# Patient Record
Sex: MALE | Race: WHITE
[De-identification: names, ages, dates, MRNs, and addresses within clinical notes are randomized per-mention and may not be internally consistent; named-entity substitution may affect disease eponyms.]

---

## 2020-04-22 ENCOUNTER — HOSPITAL ENCOUNTER (EMERGENCY)
Dept: HOSPITAL 83 - ED | Age: 55
Discharge: HOME | End: 2020-04-22
Payer: COMMERCIAL

## 2020-04-22 VITALS — BODY MASS INDEX: 32.49 KG/M2 | WEIGHT: 195 LBS | HEIGHT: 65 IN

## 2020-04-22 DIAGNOSIS — Z88.0: ICD-10-CM

## 2020-04-22 DIAGNOSIS — J32.9: Primary | ICD-10-CM

## 2020-04-22 DIAGNOSIS — Z79.899: ICD-10-CM

## 2020-04-22 DIAGNOSIS — F17.200: ICD-10-CM

## 2020-04-22 DIAGNOSIS — K21.9: ICD-10-CM

## 2020-04-22 LAB
BASOPHILS # BLD AUTO: 0 10*3/UL (ref 0–0.1)
BASOPHILS NFR BLD AUTO: 0.2 % (ref 0–1)
EOSINOPHIL # BLD AUTO: 0.1 10*3/UL (ref 0–0.4)
EOSINOPHIL # BLD AUTO: 0.9 % (ref 1–4)
ERYTHROCYTE [DISTWIDTH] IN BLOOD BY AUTOMATED COUNT: 22.6 % (ref 0–14.5)
HCT VFR BLD AUTO: 57.5 % (ref 42–52)
LYMPHOCYTES # BLD AUTO: 2.5 10*3/UL (ref 1.3–4.4)
LYMPHOCYTES NFR BLD AUTO: 18 % (ref 27–41)
MCH RBC QN AUTO: 24 PG (ref 27–31)
MCHC RBC AUTO-ENTMCNC: 31.1 G/DL (ref 33–37)
MCV RBC AUTO: 77.2 FL (ref 80–94)
MONOCYTES # BLD AUTO: 1.2 10*3/UL (ref 0.1–1)
MONOCYTES NFR BLD MANUAL: 8.6 % (ref 3–9)
NEUT #: 9.9 10*3/UL (ref 2.3–7.9)
NEUT %: 71.9 % (ref 47–73)
NRBC BLD QL AUTO: 0 10*3/UL (ref 0–0)
PLATELET # BLD AUTO: 229 10*3/UL (ref 130–400)
PMV BLD AUTO: 10.5 FL (ref 9.6–12.3)
RBC # BLD AUTO: 7.45 10*6/UL (ref 4.5–5.9)
WBC NRBC COR # BLD AUTO: 13.8 10*3/UL (ref 4.8–10.8)

## 2021-02-26 ENCOUNTER — HOSPITAL ENCOUNTER (OUTPATIENT)
Dept: HOSPITAL 83 - COVID19 | Age: 56
Discharge: HOME | End: 2021-02-26
Attending: INTERNAL MEDICINE
Payer: COMMERCIAL

## 2021-02-26 DIAGNOSIS — U07.1: Primary | ICD-10-CM

## 2021-07-06 ENCOUNTER — HOSPITAL ENCOUNTER (EMERGENCY)
Dept: HOSPITAL 83 - ED | Age: 56
LOS: 1 days | Discharge: HOME | End: 2021-07-07
Payer: COMMERCIAL

## 2021-07-06 VITALS — HEIGHT: 60.98 IN | WEIGHT: 201 LBS | BODY MASS INDEX: 37.95 KG/M2

## 2021-07-06 DIAGNOSIS — K80.20: Primary | ICD-10-CM

## 2021-07-06 DIAGNOSIS — Z79.899: ICD-10-CM

## 2021-07-06 DIAGNOSIS — Z88.0: ICD-10-CM

## 2021-07-07 LAB
ALBUMIN SERPL-MCNC: 3.5 GM/DL (ref 3.1–4.5)
ALP SERPL-CCNC: 87 U/L (ref 45–117)
ALT SERPL W P-5'-P-CCNC: 37 U/L (ref 12–78)
AST SERPL-CCNC: 15 IU/L (ref 3–35)
BASOPHILS # BLD AUTO: 0 10*3/UL (ref 0–0.1)
BASOPHILS NFR BLD AUTO: 0.2 % (ref 0–1)
BUN SERPL-MCNC: 23 MG/DL (ref 7–24)
CHLORIDE SERPL-SCNC: 107 MMOL/L (ref 98–107)
CREAT SERPL-MCNC: 1.06 MG/DL (ref 0.7–1.3)
EOSINOPHIL # BLD AUTO: 0.1 10*3/UL (ref 0–0.4)
EOSINOPHIL # BLD AUTO: 1.5 % (ref 1–4)
ERYTHROCYTE [DISTWIDTH] IN BLOOD BY AUTOMATED COUNT: 13.9 % (ref 0–14.5)
HCT VFR BLD AUTO: 47.4 % (ref 42–52)
LIPASE SERPL-CCNC: 133 U/L (ref 73–393)
LYMPHOCYTES # BLD AUTO: 2.6 10*3/UL (ref 1.3–4.4)
LYMPHOCYTES NFR BLD AUTO: 28.1 % (ref 27–41)
MCH RBC QN AUTO: 27.9 PG (ref 27–31)
MCHC RBC AUTO-ENTMCNC: 33.3 G/DL (ref 33–37)
MCV RBC AUTO: 83.7 FL (ref 80–94)
MONOCYTES # BLD AUTO: 0.8 10*3/UL (ref 0.1–1)
MONOCYTES NFR BLD MANUAL: 8.7 % (ref 3–9)
NEUT #: 5.6 10*3/UL (ref 2.3–7.9)
NEUT %: 61.3 % (ref 47–73)
NRBC BLD QL AUTO: 0 10*3/UL (ref 0–0)
PLATELET # BLD AUTO: 224 10*3/UL (ref 130–400)
PMV BLD AUTO: 10.9 FL (ref 9.6–12.3)
POTASSIUM SERPL-SCNC: 3.5 MMOL/L (ref 3.5–5.1)
PROT SERPL-MCNC: 7.4 GM/DL (ref 6.4–8.2)
RBC # BLD AUTO: 5.66 10*6/UL (ref 4.5–5.9)
SODIUM SERPL-SCNC: 137 MMOL/L (ref 136–145)
WBC NRBC COR # BLD AUTO: 9.1 10*3/UL (ref 4.8–10.8)

## 2023-07-25 ENCOUNTER — HOSPITAL ENCOUNTER (INPATIENT)
Dept: HOSPITAL 83 - ED | Age: 58
LOS: 2 days | Discharge: ANOTHER HOSPITAL | DRG: 409 | End: 2023-07-27
Attending: STUDENT IN AN ORGANIZED HEALTH CARE EDUCATION/TRAINING PROGRAM | Admitting: STUDENT IN AN ORGANIZED HEALTH CARE EDUCATION/TRAINING PROGRAM
Payer: COMMERCIAL

## 2023-07-25 VITALS — BODY MASS INDEX: 28.2 KG/M2 | HEIGHT: 65 IN | WEIGHT: 169.25 LBS

## 2023-07-25 VITALS — DIASTOLIC BLOOD PRESSURE: 100 MMHG
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
## 2023-07-25 DIAGNOSIS — D72.825: ICD-10-CM

## 2023-07-25 DIAGNOSIS — E87.1: ICD-10-CM
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
## 2023-07-25 DIAGNOSIS — Z71.6: ICD-10-CM

## 2023-07-25 DIAGNOSIS — R73.9: ICD-10-CM

## 2023-07-25 DIAGNOSIS — Z88.0: ICD-10-CM

## 2023-07-25 DIAGNOSIS — F17.290: ICD-10-CM
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
## 2023-07-25 DIAGNOSIS — D75.1: ICD-10-CM

## 2023-07-25 DIAGNOSIS — S36.13XA: ICD-10-CM
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
## 2023-07-25 DIAGNOSIS — K80.00: Primary | ICD-10-CM

## 2023-07-25 DIAGNOSIS — Z83.3: ICD-10-CM

## 2023-07-25 LAB
ALP SERPL-CCNC: 87 U/L (ref 46–116)
ALT SERPL W P-5'-P-CCNC: 29 U/L (ref 10–49)
APTT PPP: 32.7 SECONDS (ref 20–32.1)
BASOPHILS # BLD AUTO: 0 10*3/UL (ref 0–0.1)
BASOPHILS NFR BLD AUTO: 0.4 % (ref 0–1)
BUN SERPL-MCNC: 15 MG/DL (ref 9–23)
CHLORIDE SERPL-SCNC: 100 MMOL/L (ref 98–107)
EOSINOPHIL # BLD AUTO: 0.3 10*3/UL (ref 0–0.4)
EOSINOPHIL # BLD AUTO: 2.2 % (ref 1–4)
ERYTHROCYTE [DISTWIDTH] IN BLOOD BY AUTOMATED COUNT: 20.4 % (ref 0–14.5)
HCT VFR BLD AUTO: 60.7 % (ref 42–52)
HGB UR QL STRIP: (no result)
INR BLD: 1.3 (ref 2–3.5)
LIPASE SERPL-CCNC: 48 U/L (ref 12–53)
LYMPHOCYTES # BLD AUTO: 2.4 10*3/UL (ref 1.3–4.4)
LYMPHOCYTES NFR BLD AUTO: 21.6 % (ref 27–41)
MCH RBC QN AUTO: 27.1 PG (ref 27–31)
MCHC RBC AUTO-ENTMCNC: 32.9 G/DL (ref 33–37)
MCV RBC AUTO: 82.2 FL (ref 80–94)
MONOCYTES # BLD AUTO: 0.9 10*3/UL (ref 0.1–1)
MONOCYTES NFR BLD MANUAL: 8.3 % (ref 3–9)
NEUT #: 7.5 10*3/UL (ref 2.3–7.9)
NEUT %: 67.1 % (ref 47–73)
NRBC BLD QL AUTO: 0 10*3/UL (ref 0–0)
PH UR STRIP: 7.5 [PH] (ref 4.5–8)
PLATELET # BLD AUTO: 232 10*3/UL (ref 130–400)
PMV BLD AUTO: 11.4 FL (ref 9.6–12.3)
POTASSIUM SERPL-SCNC: 4 MMOL/L (ref 3.4–5.1)
PROT SERPL-MCNC: 7.7 GM/DL (ref 6–8)
RBC # BLD AUTO: 7.38 10*6/UL (ref 4.5–5.9)
SP GR UR: >= 1.03 (ref 1–1.03)
UROBILINOGEN UR STRIP-MCNC: 1 E.U./DL (ref 0–1)
WBC NRBC COR # BLD AUTO: 11.2 10*3/UL (ref 4.8–10.8)

## 2023-07-26 VITALS — SYSTOLIC BLOOD PRESSURE: 131 MMHG | DIASTOLIC BLOOD PRESSURE: 94 MMHG

## 2023-07-26 VITALS — DIASTOLIC BLOOD PRESSURE: 88 MMHG

## 2023-07-26 VITALS — DIASTOLIC BLOOD PRESSURE: 94 MMHG | SYSTOLIC BLOOD PRESSURE: 148 MMHG

## 2023-07-26 VITALS — DIASTOLIC BLOOD PRESSURE: 63 MMHG

## 2023-07-26 VITALS — DIASTOLIC BLOOD PRESSURE: 84 MMHG | SYSTOLIC BLOOD PRESSURE: 146 MMHG

## 2023-07-26 LAB
BASOPHILS # BLD AUTO: 0 10*3/UL (ref 0–0.1)
BASOPHILS NFR BLD AUTO: 0.4 % (ref 0–1)
EOSINOPHIL # BLD AUTO: 0.2 10*3/UL (ref 0–0.4)
EOSINOPHIL # BLD AUTO: 1.6 % (ref 1–4)
ERYTHROCYTE [DISTWIDTH] IN BLOOD BY AUTOMATED COUNT: 20.6 % (ref 0–14.5)
HCT VFR BLD AUTO: 59.8 % (ref 42–52)
LYMPHOCYTES # BLD AUTO: 2.3 10*3/UL (ref 1.3–4.4)
LYMPHOCYTES NFR BLD AUTO: 23.1 % (ref 27–41)
MCH RBC QN AUTO: 27.3 PG (ref 27–31)
MCHC RBC AUTO-ENTMCNC: 32.8 G/DL (ref 33–37)
MCV RBC AUTO: 83.2 FL (ref 80–94)
MONOCYTES # BLD AUTO: 1 10*3/UL (ref 0.1–1)
MONOCYTES NFR BLD MANUAL: 9.8 % (ref 3–9)
NEUT #: 6.3 10*3/UL (ref 2.3–7.9)
NEUT %: 64.6 % (ref 47–73)
NRBC BLD QL AUTO: 0 10*3/UL (ref 0–0)
PLATELET # BLD AUTO: 213 10*3/UL (ref 130–400)
PMV BLD AUTO: 10.8 FL (ref 9.6–12.3)
RBC # BLD AUTO: 7.19 10*6/UL (ref 4.5–5.9)
WBC #/AREA URNS HPF: (no result) WBC/HPF (ref 0–5)
WBC NRBC COR # BLD AUTO: 9.8 10*3/UL (ref 4.8–10.8)

## 2023-07-27 ENCOUNTER — ANESTHESIA EVENT (OUTPATIENT)
Dept: OPERATING ROOM | Age: 58
End: 2023-07-27
Payer: COMMERCIAL

## 2023-07-27 ENCOUNTER — HOSPITAL ENCOUNTER (INPATIENT)
Age: 58
LOS: 4 days | Discharge: HOME | DRG: 261 | End: 2023-07-31
Attending: TRANSPLANT SURGERY | Admitting: TRANSPLANT SURGERY
Payer: MEDICAID

## 2023-07-27 ENCOUNTER — ANESTHESIA (OUTPATIENT)
Dept: OPERATING ROOM | Age: 58
End: 2023-07-27
Payer: COMMERCIAL

## 2023-07-27 VITALS — DIASTOLIC BLOOD PRESSURE: 87 MMHG

## 2023-07-27 VITALS — DIASTOLIC BLOOD PRESSURE: 90 MMHG

## 2023-07-27 VITALS — SYSTOLIC BLOOD PRESSURE: 120 MMHG | DIASTOLIC BLOOD PRESSURE: 72 MMHG

## 2023-07-27 VITALS — DIASTOLIC BLOOD PRESSURE: 81 MMHG

## 2023-07-27 DIAGNOSIS — K91.71: Primary | ICD-10-CM

## 2023-07-27 DIAGNOSIS — S36.13XA: ICD-10-CM

## 2023-07-27 DIAGNOSIS — S36.13XA INJURY OF BILE DUCT, INITIAL ENCOUNTER: ICD-10-CM

## 2023-07-27 LAB
25(OH)D3 SERPL-MCNC: 35.5 NG/ML (ref 30–100)
ABO + RH BLD: NORMAL
ALBUMIN SERPL-MCNC: 3.7 G/DL (ref 3.5–5.2)
ALP SERPL-CCNC: 57 U/L (ref 46–116)
ALP SERPL-CCNC: 60 U/L (ref 40–129)
ALT SERPL W P-5'-P-CCNC: 26 U/L (ref 10–49)
ALT SERPL-CCNC: 72 U/L (ref 0–40)
ANION GAP SERPL CALCULATED.3IONS-SCNC: 11 MMOL/L (ref 7–16)
APTT PPP: 32.9 SECONDS (ref 20–32.1)
ARM BAND NUMBER: NORMAL
AST SERPL-CCNC: 78 U/L (ref 0–39)
BASOPHILS # BLD AUTO: 0 10*3/UL (ref 0–0.1)
BASOPHILS # BLD: 0.03 K/UL (ref 0–0.2)
BASOPHILS NFR BLD AUTO: 0.3 % (ref 0–1)
BASOPHILS NFR BLD: 0 % (ref 0–2)
BILIRUB SERPL-MCNC: 1.1 MG/DL (ref 0–1.2)
BLOOD BANK SAMPLE EXPIRATION: NORMAL
BLOOD GROUP ANTIBODIES SERPL: NEGATIVE
BUN BLD-MCNC: 10 MG/DL (ref 6–20)
BUN SERPL-MCNC: 11 MG/DL (ref 9–23)
BUN SERPL-MCNC: 16 MG/DL (ref 6–20)
CA-I BLD-SCNC: 1.1 MMOL/L (ref 1.15–1.33)
CALCIUM SERPL-MCNC: 8 MG/DL (ref 8.6–10.2)
CHLORIDE BLD-SCNC: 106 MMOL/L (ref 100–108)
CHLORIDE SERPL-SCNC: 101 MMOL/L (ref 98–107)
CHLORIDE SERPL-SCNC: 103 MMOL/L (ref 98–107)
CHOLEST SERPL-MCNC: 186 MG/DL (ref ?–200)
CO2 BLD CALC-SCNC: 22 MMOL/L (ref 22–29)
CO2 SERPL-SCNC: 21 MMOL/L (ref 22–29)
CREAT BLD-MCNC: 0.9 MG/DL (ref 0.7–1.2)
CREAT SERPL-MCNC: 1 MG/DL (ref 0.7–1.2)
EGFR, POC: >60 ML/MIN/1.73M2
EOSINOPHIL # BLD AUTO: 0.1 10*3/UL (ref 0–0.4)
EOSINOPHIL # BLD AUTO: 1.5 % (ref 1–4)
EOSINOPHIL # BLD: 0 K/UL (ref 0.05–0.5)
EOSINOPHILS RELATIVE PERCENT: 0 % (ref 0–6)
ERYTHROCYTE [DISTWIDTH] IN BLOOD BY AUTOMATED COUNT: 20 % (ref 11.5–15)
ERYTHROCYTE [DISTWIDTH] IN BLOOD BY AUTOMATED COUNT: 20.2 % (ref 0–14.5)
GFR SERPL CREATININE-BSD FRML MDRD: >60 ML/MIN/1.73M2
GLUCOSE BLD-MCNC: 141 MG/DL (ref 74–99)
GLUCOSE SERPL-MCNC: 159 MG/DL (ref 74–99)
HCT VFR BLD AUTO: 57 % (ref 37–54)
HCT VFR BLD AUTO: 57.5 % (ref 42–52)
HCT VFR BLD AUTO: 69 % (ref 37–54)
HGB BLD-MCNC: 17.4 G/DL (ref 12.5–16.5)
IMM GRANULOCYTES # BLD AUTO: 0.13 K/UL (ref 0–0.58)
IMM GRANULOCYTES NFR BLD: 1 % (ref 0–5)
INR BLD: 1.3 (ref 2–3.5)
LACTATE BLDV-SCNC: 1.9 MMOL/L (ref 0.5–2.2)
LDLC SERPL DIRECT ASSAY-MCNC: 135 MG/DL (ref 9–159)
LYMPHOCYTES # BLD AUTO: 2.2 10*3/UL (ref 1.3–4.4)
LYMPHOCYTES NFR BLD AUTO: 23.4 % (ref 27–41)
LYMPHOCYTES NFR BLD: 0.76 K/UL (ref 1.5–4)
LYMPHOCYTES RELATIVE PERCENT: 4 % (ref 20–42)
MCH RBC QN AUTO: 26.6 PG (ref 26–35)
MCH RBC QN AUTO: 27.3 PG (ref 27–31)
MCHC RBC AUTO-ENTMCNC: 30.5 G/DL (ref 32–34.5)
MCHC RBC AUTO-ENTMCNC: 32.5 G/DL (ref 33–37)
MCV RBC AUTO: 83.9 FL (ref 80–94)
MCV RBC AUTO: 87 FL (ref 80–99.9)
MONOCYTES # BLD AUTO: 0.8 10*3/UL (ref 0.1–1)
MONOCYTES NFR BLD MANUAL: 8.6 % (ref 3–9)
MONOCYTES NFR BLD: 0.28 K/UL (ref 0.1–0.95)
MONOCYTES NFR BLD: 2 % (ref 2–12)
NEGATIVE BASE EXCESS, ART: 2.8 MMOL/L
NEUT #: 6.2 10*3/UL (ref 2.3–7.9)
NEUT %: 65.6 % (ref 47–73)
NEUTROPHILS NFR BLD: 93 % (ref 43–80)
NEUTS SEG NFR BLD: 16.25 K/UL (ref 1.8–7.3)
NRBC BLD QL AUTO: 0 % (ref 0–0)
PLATELET # BLD AUTO: 212 K/UL (ref 130–450)
PLATELET # BLD AUTO: 224 10*3/UL (ref 130–400)
PMV BLD AUTO: 10.9 FL (ref 7–12)
PMV BLD AUTO: 11.5 FL (ref 9.6–12.3)
POC ANION GAP: 9 MMOL/L (ref 7–16)
POC HCO3: 21.8 MMOL/L (ref 22–26)
POC HEMOGLOBIN (CALC): >23.3 G/DL (ref 12.5–15.5)
POC LACTIC ACID: 0.8 MMOL/L (ref 0.5–2.2)
POC O2 SATURATION: 99.9 % (ref 92–98.5)
POC PCO2: 37.7 MM HG (ref 35–45)
POC PH: 7.37 (ref 7.35–7.45)
POC PO2: 278.9 MM HG (ref 80–100)
POTASSIUM BLD-SCNC: 4.5 MMOL/L (ref 3.5–5)
POTASSIUM SERPL-SCNC: 4.5 MMOL/L (ref 3.4–5.1)
POTASSIUM SERPL-SCNC: 4.7 MMOL/L (ref 3.5–5)
PROT SERPL-MCNC: 6.8 G/DL (ref 6.4–8.3)
PROT SERPL-MCNC: 7 GM/DL (ref 6–8)
RBC # BLD AUTO: 6.55 M/UL (ref 3.8–5.8)
RBC # BLD AUTO: 6.85 10*6/UL (ref 4.5–5.9)
RETICS/RBC NFR AUTO: 2.01 % (ref 0.5–2.5)
SODIUM BLD-SCNC: 137 MMOL/L (ref 132–146)
SODIUM SERPL-SCNC: 133 MMOL/L (ref 132–146)
T4 FREE SERPL-MCNC: 0.96 NG/DL (ref 0.89–1.76)
TRIGL SERPL-MCNC: 101 MG/DL (ref ?–150)
WBC NRBC COR # BLD AUTO: 9.4 10*3/UL (ref 4.8–10.8)
WBC OTHER # BLD: 17.5 K/UL (ref 4.5–11.5)

## 2023-07-27 PROCEDURE — 2580000003 HC RX 258

## 2023-07-27 PROCEDURE — 7100000001 HC PACU RECOVERY - ADDTL 15 MIN: Performed by: TRANSPLANT SURGERY

## 2023-07-27 PROCEDURE — 0F170Z3 BYPASS COMMON HEPATIC DUCT TO DUODENUM, OPEN APPROACH: ICD-10-PCS | Performed by: TRANSPLANT SURGERY

## 2023-07-27 PROCEDURE — 88305 TISSUE EXAM BY PATHOLOGIST: CPT

## 2023-07-27 PROCEDURE — 2500000003 HC RX 250 WO HCPCS: Performed by: ANESTHESIOLOGY

## 2023-07-27 PROCEDURE — 86901 BLOOD TYPING SEROLOGIC RH(D): CPT

## 2023-07-27 PROCEDURE — 0FT40ZZ RESECTION OF GALLBLADDER, OPEN APPROACH: ICD-10-PCS | Performed by: TRANSPLANT SURGERY

## 2023-07-27 PROCEDURE — 7100000000 HC PACU RECOVERY - FIRST 15 MIN: Performed by: TRANSPLANT SURGERY

## 2023-07-27 PROCEDURE — 2500000003 HC RX 250 WO HCPCS

## 2023-07-27 PROCEDURE — 6370000000 HC RX 637 (ALT 250 FOR IP)

## 2023-07-27 PROCEDURE — 85014 HEMATOCRIT: CPT

## 2023-07-27 PROCEDURE — 3600000015 HC SURGERY LEVEL 5 ADDTL 15MIN: Performed by: TRANSPLANT SURGERY

## 2023-07-27 PROCEDURE — 86900 BLOOD TYPING SEROLOGIC ABO: CPT

## 2023-07-27 PROCEDURE — 80053 COMPREHEN METABOLIC PANEL: CPT

## 2023-07-27 PROCEDURE — 3600000005 HC SURGERY LEVEL 5 BASE: Performed by: TRANSPLANT SURGERY

## 2023-07-27 PROCEDURE — 0FJB4ZZ INSPECTION OF HEPATOBILIARY DUCT, PERCUTANEOUS ENDOSCOPIC APPROACH: ICD-10-PCS | Performed by: SURGERY

## 2023-07-27 PROCEDURE — 82803 BLOOD GASES ANY COMBINATION: CPT

## 2023-07-27 PROCEDURE — 49905 OMENTAL FLAP INTRA-ABDOM: CPT | Performed by: TRANSPLANT SURGERY

## 2023-07-27 PROCEDURE — 83605 ASSAY OF LACTIC ACID: CPT

## 2023-07-27 PROCEDURE — 80047 BASIC METABLC PNL IONIZED CA: CPT

## 2023-07-27 PROCEDURE — 47600 CHOLECYSTECTOMY: CPT | Performed by: TRANSPLANT SURGERY

## 2023-07-27 PROCEDURE — 85027 COMPLETE CBC AUTOMATED: CPT

## 2023-07-27 PROCEDURE — 47801 PLACEMENT BILE DUCT SUPPORT: CPT | Performed by: TRANSPLANT SURGERY

## 2023-07-27 PROCEDURE — 99285 EMERGENCY DEPT VISIT HI MDM: CPT

## 2023-07-27 PROCEDURE — 2580000003 HC RX 258: Performed by: STUDENT IN AN ORGANIZED HEALTH CARE EDUCATION/TRAINING PROGRAM

## 2023-07-27 PROCEDURE — 86850 RBC ANTIBODY SCREEN: CPT

## 2023-07-27 PROCEDURE — 6360000002 HC RX W HCPCS: Performed by: NURSE ANESTHETIST, CERTIFIED REGISTERED

## 2023-07-27 PROCEDURE — 2709999900 HC NON-CHARGEABLE SUPPLY: Performed by: TRANSPLANT SURGERY

## 2023-07-27 PROCEDURE — 3700000001 HC ADD 15 MINUTES (ANESTHESIA): Performed by: TRANSPLANT SURGERY

## 2023-07-27 PROCEDURE — 88304 TISSUE EXAM BY PATHOLOGIST: CPT

## 2023-07-27 PROCEDURE — 6360000002 HC RX W HCPCS: Performed by: STUDENT IN AN ORGANIZED HEALTH CARE EDUCATION/TRAINING PROGRAM

## 2023-07-27 PROCEDURE — 2140000000 HC CCU INTERMEDIATE R&B

## 2023-07-27 PROCEDURE — 47760 FUSE BILE DUCTS AND BOWEL: CPT | Performed by: TRANSPLANT SURGERY

## 2023-07-27 PROCEDURE — 3700000000 HC ANESTHESIA ATTENDED CARE: Performed by: TRANSPLANT SURGERY

## 2023-07-27 PROCEDURE — 0FB90ZZ EXCISION OF COMMON BILE DUCT, OPEN APPROACH: ICD-10-PCS | Performed by: TRANSPLANT SURGERY

## 2023-07-27 PROCEDURE — 6360000002 HC RX W HCPCS

## 2023-07-27 RX ORDER — ONDANSETRON 2 MG/ML
INJECTION INTRAMUSCULAR; INTRAVENOUS PRN
Status: DISCONTINUED | OUTPATIENT
Start: 2023-07-27 | End: 2023-07-27 | Stop reason: SDUPTHER

## 2023-07-27 RX ORDER — SODIUM CHLORIDE, SODIUM LACTATE, POTASSIUM CHLORIDE, CALCIUM CHLORIDE 600; 310; 30; 20 MG/100ML; MG/100ML; MG/100ML; MG/100ML
INJECTION, SOLUTION INTRAVENOUS CONTINUOUS
Status: DISCONTINUED | OUTPATIENT
Start: 2023-07-27 | End: 2023-07-30

## 2023-07-27 RX ORDER — LABETALOL HYDROCHLORIDE 5 MG/ML
5 INJECTION, SOLUTION INTRAVENOUS
Status: DISCONTINUED | OUTPATIENT
Start: 2023-07-27 | End: 2023-07-27 | Stop reason: HOSPADM

## 2023-07-27 RX ORDER — ONDANSETRON 2 MG/ML
4 INJECTION INTRAMUSCULAR; INTRAVENOUS
Status: DISCONTINUED | OUTPATIENT
Start: 2023-07-27 | End: 2023-07-27 | Stop reason: HOSPADM

## 2023-07-27 RX ORDER — PROMETHAZINE HYDROCHLORIDE 25 MG/ML
12.5 INJECTION, SOLUTION INTRAMUSCULAR; INTRAVENOUS EVERY 4 HOURS PRN
Status: DISCONTINUED | OUTPATIENT
Start: 2023-07-27 | End: 2023-07-31 | Stop reason: HOSPADM

## 2023-07-27 RX ORDER — SODIUM CHLORIDE 0.9 % (FLUSH) 0.9 %
5-40 SYRINGE (ML) INJECTION PRN
Status: DISCONTINUED | OUTPATIENT
Start: 2023-07-27 | End: 2023-07-27 | Stop reason: HOSPADM

## 2023-07-27 RX ORDER — HYDROMORPHONE HYDROCHLORIDE 1 MG/ML
0.5 INJECTION, SOLUTION INTRAMUSCULAR; INTRAVENOUS; SUBCUTANEOUS EVERY 5 MIN PRN
Status: COMPLETED | OUTPATIENT
Start: 2023-07-27 | End: 2023-07-27

## 2023-07-27 RX ORDER — MIDAZOLAM HYDROCHLORIDE 2 MG/2ML
2 INJECTION, SOLUTION INTRAMUSCULAR; INTRAVENOUS
Status: DISCONTINUED | OUTPATIENT
Start: 2023-07-27 | End: 2023-07-27 | Stop reason: HOSPADM

## 2023-07-27 RX ORDER — OXYMETAZOLINE HYDROCHLORIDE 0.05 G/100ML
SPRAY NASAL PRN
Status: DISCONTINUED | OUTPATIENT
Start: 2023-07-27 | End: 2023-07-27 | Stop reason: SDUPTHER

## 2023-07-27 RX ORDER — IPRATROPIUM BROMIDE AND ALBUTEROL SULFATE 2.5; .5 MG/3ML; MG/3ML
1 SOLUTION RESPIRATORY (INHALATION)
Status: DISCONTINUED | OUTPATIENT
Start: 2023-07-27 | End: 2023-07-27 | Stop reason: HOSPADM

## 2023-07-27 RX ORDER — ONDANSETRON 4 MG/1
4 TABLET, ORALLY DISINTEGRATING ORAL EVERY 8 HOURS PRN
Status: DISCONTINUED | OUTPATIENT
Start: 2023-07-27 | End: 2023-07-31 | Stop reason: HOSPADM

## 2023-07-27 RX ORDER — SODIUM CHLORIDE 0.9 % (FLUSH) 0.9 %
5-40 SYRINGE (ML) INJECTION EVERY 12 HOURS SCHEDULED
Status: DISCONTINUED | OUTPATIENT
Start: 2023-07-27 | End: 2023-07-27 | Stop reason: HOSPADM

## 2023-07-27 RX ORDER — METRONIDAZOLE 500 MG/100ML
500 INJECTION, SOLUTION INTRAVENOUS EVERY 8 HOURS
Status: DISCONTINUED | OUTPATIENT
Start: 2023-07-27 | End: 2023-07-31 | Stop reason: HOSPADM

## 2023-07-27 RX ORDER — SODIUM CHLORIDE 9 MG/ML
INJECTION, SOLUTION INTRAVENOUS PRN
Status: DISCONTINUED | OUTPATIENT
Start: 2023-07-27 | End: 2023-07-27 | Stop reason: HOSPADM

## 2023-07-27 RX ORDER — LABETALOL HYDROCHLORIDE 5 MG/ML
INJECTION, SOLUTION INTRAVENOUS PRN
Status: DISCONTINUED | OUTPATIENT
Start: 2023-07-27 | End: 2023-07-27 | Stop reason: SDUPTHER

## 2023-07-27 RX ORDER — BISACODYL 10 MG
10 SUPPOSITORY, RECTAL RECTAL DAILY PRN
Status: DISCONTINUED | OUTPATIENT
Start: 2023-07-27 | End: 2023-07-30

## 2023-07-27 RX ORDER — ENOXAPARIN SODIUM 100 MG/ML
40 INJECTION SUBCUTANEOUS DAILY
Status: DISCONTINUED | OUTPATIENT
Start: 2023-07-28 | End: 2023-07-31 | Stop reason: HOSPADM

## 2023-07-27 RX ORDER — SODIUM CHLORIDE, SODIUM LACTATE, POTASSIUM CHLORIDE, CALCIUM CHLORIDE 600; 310; 30; 20 MG/100ML; MG/100ML; MG/100ML; MG/100ML
INJECTION, SOLUTION INTRAVENOUS CONTINUOUS PRN
Status: DISCONTINUED | OUTPATIENT
Start: 2023-07-27 | End: 2023-07-27 | Stop reason: SDUPTHER

## 2023-07-27 RX ORDER — ONDANSETRON 2 MG/ML
4 INJECTION INTRAMUSCULAR; INTRAVENOUS EVERY 6 HOURS PRN
Status: DISCONTINUED | OUTPATIENT
Start: 2023-07-27 | End: 2023-07-31 | Stop reason: HOSPADM

## 2023-07-27 RX ORDER — SODIUM CHLORIDE 9 MG/ML
INJECTION, SOLUTION INTRAVENOUS PRN
Status: DISCONTINUED | OUTPATIENT
Start: 2023-07-27 | End: 2023-07-31 | Stop reason: HOSPADM

## 2023-07-27 RX ORDER — ACETAMINOPHEN 325 MG/1
650 TABLET ORAL
Status: DISCONTINUED | OUTPATIENT
Start: 2023-07-27 | End: 2023-07-27 | Stop reason: HOSPADM

## 2023-07-27 RX ORDER — NALOXONE HYDROCHLORIDE 0.4 MG/ML
INJECTION, SOLUTION INTRAMUSCULAR; INTRAVENOUS; SUBCUTANEOUS PRN
Status: DISCONTINUED | OUTPATIENT
Start: 2023-07-27 | End: 2023-07-30

## 2023-07-27 RX ORDER — DROPERIDOL 2.5 MG/ML
0.62 INJECTION, SOLUTION INTRAMUSCULAR; INTRAVENOUS
Status: DISCONTINUED | OUTPATIENT
Start: 2023-07-27 | End: 2023-07-27 | Stop reason: HOSPADM

## 2023-07-27 RX ORDER — ROCURONIUM BROMIDE 10 MG/ML
INJECTION, SOLUTION INTRAVENOUS PRN
Status: DISCONTINUED | OUTPATIENT
Start: 2023-07-27 | End: 2023-07-27 | Stop reason: SDUPTHER

## 2023-07-27 RX ORDER — DIPHENHYDRAMINE HYDROCHLORIDE 50 MG/ML
12.5 INJECTION INTRAMUSCULAR; INTRAVENOUS
Status: DISCONTINUED | OUTPATIENT
Start: 2023-07-27 | End: 2023-07-27 | Stop reason: HOSPADM

## 2023-07-27 RX ORDER — DEXAMETHASONE SODIUM PHOSPHATE 10 MG/ML
INJECTION INTRAMUSCULAR; INTRAVENOUS PRN
Status: DISCONTINUED | OUTPATIENT
Start: 2023-07-27 | End: 2023-07-27 | Stop reason: SDUPTHER

## 2023-07-27 RX ORDER — PHENYLEPHRINE HCL IN 0.9% NACL 1 MG/10 ML
SYRINGE (ML) INTRAVENOUS PRN
Status: DISCONTINUED | OUTPATIENT
Start: 2023-07-27 | End: 2023-07-27 | Stop reason: SDUPTHER

## 2023-07-27 RX ORDER — CEFAZOLIN SODIUM 1 G/3ML
INJECTION, POWDER, FOR SOLUTION INTRAMUSCULAR; INTRAVENOUS PRN
Status: DISCONTINUED | OUTPATIENT
Start: 2023-07-27 | End: 2023-07-27 | Stop reason: SDUPTHER

## 2023-07-27 RX ORDER — SODIUM CHLORIDE 9 MG/ML
INJECTION, SOLUTION INTRAVENOUS CONTINUOUS PRN
Status: DISCONTINUED | OUTPATIENT
Start: 2023-07-27 | End: 2023-07-27 | Stop reason: SDUPTHER

## 2023-07-27 RX ORDER — LEVOFLOXACIN 5 MG/ML
750 INJECTION, SOLUTION INTRAVENOUS EVERY 24 HOURS
Status: DISCONTINUED | OUTPATIENT
Start: 2023-07-27 | End: 2023-07-31 | Stop reason: HOSPADM

## 2023-07-27 RX ORDER — SODIUM CHLORIDE 0.9 % (FLUSH) 0.9 %
10 SYRINGE (ML) INJECTION PRN
Status: DISCONTINUED | OUTPATIENT
Start: 2023-07-27 | End: 2023-07-31 | Stop reason: HOSPADM

## 2023-07-27 RX ORDER — HYDROMORPHONE HYDROCHLORIDE 1 MG/ML
0.25 INJECTION, SOLUTION INTRAMUSCULAR; INTRAVENOUS; SUBCUTANEOUS EVERY 5 MIN PRN
Status: DISCONTINUED | OUTPATIENT
Start: 2023-07-27 | End: 2023-07-27 | Stop reason: HOSPADM

## 2023-07-27 RX ORDER — FENTANYL CITRATE 50 UG/ML
INJECTION, SOLUTION INTRAMUSCULAR; INTRAVENOUS PRN
Status: DISCONTINUED | OUTPATIENT
Start: 2023-07-27 | End: 2023-07-27 | Stop reason: SDUPTHER

## 2023-07-27 RX ORDER — HYDRALAZINE HYDROCHLORIDE 20 MG/ML
5 INJECTION INTRAMUSCULAR; INTRAVENOUS
Status: DISCONTINUED | OUTPATIENT
Start: 2023-07-27 | End: 2023-07-27 | Stop reason: HOSPADM

## 2023-07-27 RX ORDER — SODIUM CHLORIDE 0.9 % (FLUSH) 0.9 %
10 SYRINGE (ML) INJECTION EVERY 12 HOURS SCHEDULED
Status: DISCONTINUED | OUTPATIENT
Start: 2023-07-27 | End: 2023-07-31 | Stop reason: HOSPADM

## 2023-07-27 RX ADMIN — METRONIDAZOLE 500 MG: 500 INJECTION, SOLUTION INTRAVENOUS at 22:56

## 2023-07-27 RX ADMIN — HYDROMORPHONE HYDROCHLORIDE 0.5 MG: 1 INJECTION, SOLUTION INTRAMUSCULAR; INTRAVENOUS; SUBCUTANEOUS at 19:50

## 2023-07-27 RX ADMIN — HYDROMORPHONE HYDROCHLORIDE 0.5 MG: 1 INJECTION, SOLUTION INTRAMUSCULAR; INTRAVENOUS; SUBCUTANEOUS at 19:35

## 2023-07-27 RX ADMIN — CEFAZOLIN 2 G: 1 INJECTION, POWDER, FOR SOLUTION INTRAMUSCULAR; INTRAVENOUS at 16:20

## 2023-07-27 RX ADMIN — HYDROMORPHONE HYDROCHLORIDE 0.5 MG: 1 INJECTION, SOLUTION INTRAMUSCULAR; INTRAVENOUS; SUBCUTANEOUS at 19:57

## 2023-07-27 RX ADMIN — ONDANSETRON HYDROCHLORIDE 4 MG: 2 SOLUTION INTRAMUSCULAR; INTRAVENOUS at 18:19

## 2023-07-27 RX ADMIN — SODIUM CHLORIDE, POTASSIUM CHLORIDE, SODIUM LACTATE AND CALCIUM CHLORIDE: 600; 310; 30; 20 INJECTION, SOLUTION INTRAVENOUS at 16:01

## 2023-07-27 RX ADMIN — SODIUM CHLORIDE, POTASSIUM CHLORIDE, SODIUM LACTATE AND CALCIUM CHLORIDE: 600; 310; 30; 20 INJECTION, SOLUTION INTRAVENOUS at 22:38

## 2023-07-27 RX ADMIN — SODIUM CHLORIDE: 9 INJECTION, SOLUTION INTRAVENOUS at 16:00

## 2023-07-27 RX ADMIN — FENTANYL CITRATE 50 MCG: 50 INJECTION, SOLUTION INTRAMUSCULAR; INTRAVENOUS at 18:14

## 2023-07-27 RX ADMIN — LABETALOL HYDROCHLORIDE 2.5 MG: 5 INJECTION INTRAVENOUS at 18:39

## 2023-07-27 RX ADMIN — DEXAMETHASONE SODIUM PHOSPHATE 10 MG: 10 INJECTION INTRAMUSCULAR; INTRAVENOUS at 18:19

## 2023-07-27 RX ADMIN — Medication 4 SPRAY: at 17:29

## 2023-07-27 RX ADMIN — FENTANYL CITRATE 50 MCG: 50 INJECTION, SOLUTION INTRAMUSCULAR; INTRAVENOUS at 16:37

## 2023-07-27 RX ADMIN — SODIUM CHLORIDE, PRESERVATIVE FREE 10 ML: 5 INJECTION INTRAVENOUS at 22:55

## 2023-07-27 RX ADMIN — SODIUM CHLORIDE, POTASSIUM CHLORIDE, SODIUM LACTATE AND CALCIUM CHLORIDE: 600; 310; 30; 20 INJECTION, SOLUTION INTRAVENOUS at 17:50

## 2023-07-27 RX ADMIN — Medication 100 MCG: at 16:46

## 2023-07-27 RX ADMIN — Medication: at 23:32

## 2023-07-27 RX ADMIN — SODIUM CHLORIDE: 9 INJECTION, SOLUTION INTRAVENOUS at 17:28

## 2023-07-27 RX ADMIN — HYDROMORPHONE HYDROCHLORIDE 0.5 MG: 1 INJECTION, SOLUTION INTRAMUSCULAR; INTRAVENOUS; SUBCUTANEOUS at 19:30

## 2023-07-27 RX ADMIN — ROCURONIUM BROMIDE 50 MG: 10 INJECTION, SOLUTION INTRAVENOUS at 16:10

## 2023-07-27 ASSESSMENT — PAIN SCALES - GENERAL
PAINLEVEL_OUTOF10: 10

## 2023-07-27 ASSESSMENT — PAIN DESCRIPTION - LOCATION
LOCATION: ABDOMEN

## 2023-07-27 ASSESSMENT — PAIN DESCRIPTION - PAIN TYPE
TYPE: SURGICAL PAIN;ACUTE PAIN
TYPE: SURGICAL PAIN
TYPE: SURGICAL PAIN;ACUTE PAIN

## 2023-07-27 ASSESSMENT — PAIN DESCRIPTION - DESCRIPTORS
DESCRIPTORS: ACHING;SHARP;SHOOTING
DESCRIPTORS: SHARP
DESCRIPTORS: DISCOMFORT

## 2023-07-27 ASSESSMENT — LIFESTYLE VARIABLES: SMOKING_STATUS: 1

## 2023-07-27 ASSESSMENT — PAIN DESCRIPTION - FREQUENCY
FREQUENCY: CONTINUOUS

## 2023-07-27 ASSESSMENT — PAIN DESCRIPTION - ORIENTATION
ORIENTATION: MID;INNER
ORIENTATION: INNER;MID
ORIENTATION: MID;LOWER

## 2023-07-27 NOTE — ANESTHESIA POSTPROCEDURE EVALUATION
Department of Anesthesiology  Postprocedure Note    Patient: Lane Hilario  MRN: [id number]  YOB: 1965  Date of evaluation: 7/27/2023      Procedure Summary     Date: 07/27/23 Room / Location: [facility] OR 05 / [facility]    Anesthesia Start: 1600 Anesthesia Stop: 1858    Procedure: Bile Duct Repair, Hepaticoduodenostomy with Cholecystectomy (Abdomen) Diagnosis:       Injury of bile duct, initial encounter      (Injury of bile duct, initial encounter [phone])    Surgeons: Julio Hall MD Responsible Provider: Clive Hawkins MD    Anesthesia Type: general ASA Status: 4 - Emergent          Anesthesia Type: No value filed.     Chip Phase I:      Chip Phase II:        Anesthesia Post Evaluation    Patient location during evaluation: PACU  Patient participation: complete - patient participated  Level of consciousness: awake and alert  Airway patency: patent  Nausea & Vomiting: no nausea and no vomiting  Complications: no  Cardiovascular status: hemodynamically stable  Respiratory status: acceptable  Hydration status: euvolemic

## 2023-07-27 NOTE — PROGRESS NOTES
[facility] Surgery    Direct phone call from Dr. Eduardo Garrett at [facility].  Common bile duct transection recognized in the operating room without bleeding. Emergent transfer from OR to OR. Patient has no next of kin in his chart.   Emergent operation for bile duct reconstruction    Electronically signed by Li Augustin MD on 7/27/2023 at 4:26 PM

## 2023-07-27 NOTE — ED PROVIDER NOTES
Justino        Pt Name: Dinesh Ortiz  MRN: [id number]  [address] [facility] [place] 1965  Date of evaluation: 7/27/2023  Provider: Elyssa Butler DO  PCP: No primary care provider on file. Note Started: 3:57 PM EDT 7/27/23    CHIEF COMPLAINT       No chief complaint on file. HISTORY OF PRESENT ILLNESS: 1 or more Elements        Limitations to history : Sedated    Dinesh Ortiz is a 56 y.o. male who presents for emergent operative management. Patient was apparently at [facility] having a cholecystectomy when the common bile duct was interrupted. The surgeon at [facility] spoke with our Dr. Jesusita Marin who requested transfer for operative repair. Patient arrived via stat medevac sedated, intubated on mechanical ventilation, stopped in the ED for registration and was taken directly to the OR as surgeon is scrubbed in and awaiting. Medical screening exam was performed. Nursing Notes were all reviewed and agreed with or any disagreements were addressed in the HPI. REVIEW OF EXTERNAL NOTE :       No recent notes to review in EMR    REVIEW OF SYSTEMS :      Positives and Pertinent negatives as per HPI. SURGICAL HISTORY   No past surgical history on file. CURRENTMEDICATIONS       Previous Medications    No medications on file       ALLERGIES     Patient has no allergy information on record. FAMILYHISTORY     No family history on file. SOCIAL HISTORY          SCREENINGS                                     PHYSICAL EXAM  1 or more Elements     ED Triage Vitals   BP Temp Temp src Pulse Resp SpO2 Height Weight   -- -- -- -- -- -- -- --           Constitutional/General: Sedated with mechanical ventilation  Head: Normocephalic and atraumatic  Respiratory: Lungs clear to auscultation bilaterally, no wheezes, rales, or rhonchi. Not in respiratory distress  Cardiovascular:  Regular rate.  Regular

## 2023-07-27 NOTE — ANESTHESIA PROCEDURE NOTES
Arterial Line:    An arterial line was placed using surface landmarks, in the OR for the following indication(s): continuous blood pressure monitoring and blood sampling needed. A 20 gauge (size), 1 and 3/4 inch (length), Arrow (type) catheter was placed, Seldinger technique used, into the left radial artery, secured by tape and Tegaderm. Anesthesia type: General    Events:  patient tolerated procedure well with no complications. 7/27/2023 4:10 PM7/27/2023 4:15 PM  Anesthesiologist: Fernando Hernández MD  Resident/CRNA: MIRNA Garsia CRNA  Other anesthesia staff: Gio Ferrer RN  Performed:  Other anesthesia staff   Preanesthetic Checklist  Completed: patient identified, IV checked, site marked, risks and benefits discussed, surgical/procedural consents, equipment checked, pre-op evaluation, timeout performed, anesthesia consent given, oxygen available and monitors applied/VS acknowledged

## 2023-07-27 NOTE — PROGRESS NOTES
Patient arrived to OR suite via [facility] as OR to OR Transfer from [facility]. With patient was a bag of belongings from [facility] as well as a paper medical record from his visit(s) regarding his current treatment. Belongings will return with patient to recovery and nurse will be notified at that time.      Electronically signed by Maria Fernanda Duncan RN on 7/27/23 at 5:58 PM EDT

## 2023-07-27 NOTE — ANESTHESIA PRE PROCEDURE
visit.                                           BP Readings from Last 3 Encounters:   No data found for BP       NPO Status:                                                                                 BMI:   Wt Readings from Last 3 Encounters:   No data found for Wt     There is no height or weight on file to calculate BMI.    CBC: No results found for: WBC, RBC, HGB, HCT, MCV, RDW, PLT    CMP:   Lab Results   Component Value Date/Time    CREATININE 0.9 07/27/2023 04:17 PM       POC Tests:   Recent Labs     07/27/23  1617   POCGLU 141*   POCNA 137   POCK 4.5   POCCL 106   POCBUN 10   POCHCT 69*       Coags: No results found for: PROTIME, INR, APTT    HCG (If Applicable): No results found for: PREGTESTUR, PREGSERUM, HCG, HCGQUANT     ABGs: No results found for: PHART, PO2ART, DBS6ELV, DGI9XYA, BEART, P2NPGKTS     Type & Screen (If Applicable):  No results found for: LABABO, LABRH    Drug/Infectious Status (If Applicable):  No results found for: HIV, HEPCAB    COVID-19 Screening (If Applicable): No results found for: COVID19        Anesthesia Evaluation  Patient summary reviewed  Airway: Mallampati: Unable to assess / NA         Intubated Dental:          Pulmonary: breath sounds clear to auscultation  (+) current smoker                           Cardiovascular:            Rhythm: regular  Rate: normal                    Neuro/Psych:                ROS comment: Intubated and sedated on arrival to OR GI/Hepatic/Renal:            ROS comment: Acute cholecystitis  . Endo/Other:    (+) blood dyscrasia (Polycythemia)::., electrolyte abnormalities (Hyponatremia), . Abdominal:             Vascular: negative vascular ROS. Other Findings:           Anesthesia Plan      general     ASA 4 - emergent       Induction: intravenous. arterial line                        Unable to obtain anesthesia consent from patient or patient's family due to patient's medical condition and emergent need for surgery.

## 2023-07-28 LAB
ALBUMIN SERPL-MCNC: 4.1 G/DL (ref 3.5–5.2)
ALP SERPL-CCNC: 58 U/L (ref 40–129)
ALT SERPL-CCNC: 59 U/L (ref 0–40)
ANION GAP SERPL CALCULATED.3IONS-SCNC: 11 MMOL/L (ref 7–16)
AST SERPL-CCNC: 65 U/L (ref 0–39)
BASOPHILS # BLD: 0 K/UL (ref 0–0.2)
BASOPHILS NFR BLD: 0 % (ref 0–2)
BILIRUB DIRECT SERPL-MCNC: 0.4 MG/DL (ref 0–0.3)
BILIRUB INDIRECT SERPL-MCNC: 0.8 MG/DL (ref 0–1)
BILIRUB SERPL-MCNC: 1.2 MG/DL (ref 0–1.2)
BUN SERPL-MCNC: 20 MG/DL (ref 6–20)
CALCIUM SERPL-MCNC: 8.9 MG/DL (ref 8.6–10.2)
CHLORIDE SERPL-SCNC: 101 MMOL/L (ref 98–107)
CO2 SERPL-SCNC: 24 MMOL/L (ref 22–29)
CREAT SERPL-MCNC: 1.1 MG/DL (ref 0.7–1.2)
EOSINOPHIL # BLD: 0.15 K/UL (ref 0.05–0.5)
EOSINOPHILS RELATIVE PERCENT: 1 % (ref 0–6)
ERYTHROCYTE [DISTWIDTH] IN BLOOD BY AUTOMATED COUNT: 20.3 % (ref 11.5–15)
GFR SERPL CREATININE-BSD FRML MDRD: >60 ML/MIN/1.73M2
GLUCOSE SERPL-MCNC: 99 MG/DL (ref 74–99)
HCT VFR BLD AUTO: 57.5 % (ref 37–54)
HGB BLD-MCNC: 17.8 G/DL (ref 12.5–16.5)
LYMPHOCYTES NFR BLD: 1.08 K/UL (ref 1.5–4)
LYMPHOCYTES RELATIVE PERCENT: 6 % (ref 20–42)
MCH RBC QN AUTO: 26.7 PG (ref 26–35)
MCHC RBC AUTO-ENTMCNC: 31 G/DL (ref 32–34.5)
MCV RBC AUTO: 86.3 FL (ref 80–99.9)
MONOCYTES NFR BLD: 1.39 K/UL (ref 0.1–0.95)
MONOCYTES NFR BLD: 8 % (ref 2–12)
NEUTROPHILS NFR BLD: 85 % (ref 43–80)
NEUTS SEG NFR BLD: 15.17 K/UL (ref 1.8–7.3)
PLATELET # BLD AUTO: 257 K/UL (ref 130–450)
PMV BLD AUTO: 10.8 FL (ref 7–12)
POTASSIUM SERPL-SCNC: 4.8 MMOL/L (ref 3.5–5)
PROT SERPL-MCNC: 7.1 G/DL (ref 6.4–8.3)
RBC # BLD AUTO: 6.66 M/UL (ref 3.8–5.8)
RBC # BLD: ABNORMAL 10*6/UL
RBC # BLD: ABNORMAL 10*6/UL
SODIUM SERPL-SCNC: 136 MMOL/L (ref 132–146)
WBC OTHER # BLD: 17.8 K/UL (ref 4.5–11.5)

## 2023-07-28 PROCEDURE — 80076 HEPATIC FUNCTION PANEL: CPT

## 2023-07-28 PROCEDURE — 2580000003 HC RX 258: Performed by: STUDENT IN AN ORGANIZED HEALTH CARE EDUCATION/TRAINING PROGRAM

## 2023-07-28 PROCEDURE — 80048 BASIC METABOLIC PNL TOTAL CA: CPT

## 2023-07-28 PROCEDURE — 80074 ACUTE HEPATITIS PANEL: CPT

## 2023-07-28 PROCEDURE — C9113 INJ PANTOPRAZOLE SODIUM, VIA: HCPCS

## 2023-07-28 PROCEDURE — 6360000002 HC RX W HCPCS

## 2023-07-28 PROCEDURE — 2140000000 HC CCU INTERMEDIATE R&B

## 2023-07-28 PROCEDURE — 85027 COMPLETE CBC AUTOMATED: CPT

## 2023-07-28 PROCEDURE — 2700000000 HC OXYGEN THERAPY PER DAY

## 2023-07-28 PROCEDURE — 6360000002 HC RX W HCPCS: Performed by: STUDENT IN AN ORGANIZED HEALTH CARE EDUCATION/TRAINING PROGRAM

## 2023-07-28 RX ORDER — PANTOPRAZOLE SODIUM 40 MG/10ML
40 INJECTION, POWDER, LYOPHILIZED, FOR SOLUTION INTRAVENOUS 2 TIMES DAILY
Status: DISCONTINUED | OUTPATIENT
Start: 2023-07-28 | End: 2023-07-31 | Stop reason: HOSPADM

## 2023-07-28 RX ADMIN — METHOCARBAMOL 500 MG: 100 INJECTION INTRAMUSCULAR; INTRAVENOUS at 16:13

## 2023-07-28 RX ADMIN — SODIUM CHLORIDE, PRESERVATIVE FREE 10 ML: 5 INJECTION INTRAVENOUS at 08:59

## 2023-07-28 RX ADMIN — METHOCARBAMOL 500 MG: 100 INJECTION INTRAMUSCULAR; INTRAVENOUS at 00:20

## 2023-07-28 RX ADMIN — PANTOPRAZOLE SODIUM 40 MG: 40 INJECTION, POWDER, FOR SOLUTION INTRAVENOUS at 08:59

## 2023-07-28 RX ADMIN — METRONIDAZOLE 500 MG: 500 INJECTION, SOLUTION INTRAVENOUS at 04:30

## 2023-07-28 RX ADMIN — METHOCARBAMOL 500 MG: 100 INJECTION INTRAMUSCULAR; INTRAVENOUS at 09:05

## 2023-07-28 RX ADMIN — METRONIDAZOLE 500 MG: 500 INJECTION, SOLUTION INTRAVENOUS at 21:48

## 2023-07-28 RX ADMIN — PANTOPRAZOLE SODIUM 40 MG: 40 INJECTION, POWDER, FOR SOLUTION INTRAVENOUS at 21:47

## 2023-07-28 RX ADMIN — ENOXAPARIN SODIUM 40 MG: 100 INJECTION SUBCUTANEOUS at 08:58

## 2023-07-28 RX ADMIN — SODIUM CHLORIDE, PRESERVATIVE FREE 10 ML: 5 INJECTION INTRAVENOUS at 21:57

## 2023-07-28 RX ADMIN — SODIUM CHLORIDE, POTASSIUM CHLORIDE, SODIUM LACTATE AND CALCIUM CHLORIDE: 600; 310; 30; 20 INJECTION, SOLUTION INTRAVENOUS at 15:10

## 2023-07-28 RX ADMIN — LEVOFLOXACIN 750 MG: 5 INJECTION, SOLUTION INTRAVENOUS at 00:44

## 2023-07-28 RX ADMIN — METRONIDAZOLE 500 MG: 500 INJECTION, SOLUTION INTRAVENOUS at 11:55

## 2023-07-28 ASSESSMENT — PAIN DESCRIPTION - LOCATION
LOCATION: ABDOMEN

## 2023-07-28 ASSESSMENT — PAIN DESCRIPTION - ONSET
ONSET: ON-GOING

## 2023-07-28 ASSESSMENT — PAIN - FUNCTIONAL ASSESSMENT
PAIN_FUNCTIONAL_ASSESSMENT: ACTIVITIES ARE NOT PREVENTED

## 2023-07-28 ASSESSMENT — PAIN DESCRIPTION - ORIENTATION
ORIENTATION: MID

## 2023-07-28 ASSESSMENT — PAIN DESCRIPTION - PAIN TYPE
TYPE: SURGICAL PAIN

## 2023-07-28 ASSESSMENT — PAIN SCALES - GENERAL
PAINLEVEL_OUTOF10: 6
PAINLEVEL_OUTOF10: 4
PAINLEVEL_OUTOF10: 3
PAINLEVEL_OUTOF10: 4
PAINLEVEL_OUTOF10: 3
PAINLEVEL_OUTOF10: 5
PAINLEVEL_OUTOF10: 2
PAINLEVEL_OUTOF10: 7

## 2023-07-28 ASSESSMENT — PAIN DESCRIPTION - FREQUENCY
FREQUENCY: CONTINUOUS

## 2023-07-28 ASSESSMENT — PAIN DESCRIPTION - DESCRIPTORS
DESCRIPTORS: ACHING;DISCOMFORT;SORE

## 2023-07-28 NOTE — CARE COORDINATION
Social Work/ Case Management Transition of Care Planning ([address], [address]): Pt was transferred from [facility] for an emergent operative issue. SW met with Pt at bedside who states he lives in a one story home with no stairs to enter. Pt states he lives alone. Pt does drive and works. Pt states his emergency contact is Jaylon Varela [phone]. Pt states he has no history with hhc/snf. Pt's discharge plan is home with no needs. DECLAN/JUAN JOSÉ  to follow. [address] [place]  7/28/2023    Case Management Assessment  Initial Evaluation    Date/Time of Evaluation: 7/28/2023 11:40 AM  Assessment Completed by: 1 SEBASTIAN Gupta    If patient is discharged prior to next notation, then this note serves as note for discharge by case management. Patient Name: Dinesh Ortiz                   YOB: 1965  Diagnosis: Common bile duct transection, initial encounter [S36.13XA]  Accidental intraoperative puncture involving digestive system during digestive system procedure [K91.71]                   Date / Time: 7/27/2023  4:31 PM    Patient Admission Status: Inpatient   Readmission Risk (Low < 19, Mod (19-27), High > 27): Readmission Risk Score: 10.7    Current PCP: No primary care provider on file. PCP verified by ? Yes [facility]))    Chart Reviewed: Yes      History Provided by: Patient  Patient Orientation: Alert and Oriented    Patient Cognition: Alert    Hospitalization in the last 30 days (Readmission):  No    If yes, Readmission Assessment in CM Navigator will be completed.     Advance Directives:      Code Status: Full Code   Patient's Primary Decision Maker is:        Discharge Planning:    Patient lives with: Alone Type of Home: House  Primary Care Giver: Self  Patient Support Systems include: Friends/Neighbors   Current Financial resources:    Current community resources:    Current services prior to admission: None            Current DME:              Type of

## 2023-07-28 NOTE — PROGRESS NOTES
[facility] Quality Flow/Interdisciplinary Rounds Progress Note        Quality Flow Rounds held on July 28, 2023    Disciplines Attending:  Bedside Nurse, , , and Nursing Unit Leadership    Amrita Duval was admitted on 7/27/2023  4:31 PM    Anticipated Discharge Date:       Disposition:    Kt Score:  Kt Scale Score: 19    Readmission Risk              Risk of Unplanned Readmission:  7           Discussed patient goal for the day, patient clinical progression, and barriers to discharge.   The following Goal(s) of the Day/Commitment(s) have been identified:  Diagnostics - Report Results and Labs - Report Results      Elio Suarez RN  July 28, 2023

## 2023-07-28 NOTE — H&P
Hepatobiliary and Pancreatic Surgery Attending History and Physical    Patient's Name/Date of Birth: Karishma Bowles /1965 (55 y.o.)    Date: July 27, 2023     CC: common bile duct injury    HPI:  Patient presented to our hospital from an outside institution via [address] to OR transfer. I spoke with the surgeon from [facility] who informed me that the patient had acute cholecystitis. He was taken to the OR for a laparoscopic cholecystectomy. He states that he dissected out the gallbladder and cystic duct and transected it. Upon transection he had two lumens as the cystic duct was adhered to the common bile duct. He then called me to inform me that he had a common bile duct transection. I informed in to place drains, keep the patient intubated and we will send a helicopter to definitely repair the injury. The patient arrived and was taken immediately to the OR.     PMH: unable to obtain    PSH: laparoscopy performed that day    Current Facility-Administered Medications   Medication Dose Route Frequency Provider Last Rate Last Admin    sodium chloride flush 0.9 % injection 10 mL  10 mL IntraVENous 2 times per day Gianmarino C Gianfrate, DO        sodium chloride flush 0.9 % injection 10 mL  10 mL IntraVENous PRN Gianmarino C Gianfrate, DO        0.9 % sodium chloride infusion   IntraVENous PRN Gianmarino C Gianfrate, DO        ondansetron (ZOFRAN-ODT) disintegrating tablet 4 mg  4 mg Oral Q8H PRN Gianmarino C Gianfrate, DO        Or    ondansetron (ZOFRAN) injection 4 mg  4 mg IntraVENous Q6H PRN Gianmarino C Gianfrate, DO        enoxaparin (LOVENOX) injection 40 mg  40 mg SubCUTAneous Daily Gianmarino C Gianfrate, DO        lactated ringers IV soln infusion   IntraVENous Continuous Gianmarino C Gianfrate, DO        naloxone 0.4 mg in 10 mL sodium chloride syringe   IntraVENous PRN Gianmarino C Gianfrate, DO        bisacodyl (DULCOLAX) suppository 10 mg  10 mg Rectal Daily PRN Gianmarino C Gianfrate,

## 2023-07-28 NOTE — ACP (ADVANCE CARE PLANNING)
Advance Care Planning   The patient has the following advanced directives on file:  Advance Directives       Power of  Living Will ACP-Advance Directive ACP-Power of     Not on File Not on File Not on File Not on File            The patient has appointed the following active healthcare agents:  Aviva Batista [phone]    The Patient has the following current code status:    Code Status: Full Code      Mack Levi, [address] [facility]  7/28/2023

## 2023-07-28 NOTE — OP NOTE
Procedure Note     Operative Date: 7/27/2023     Attending Surgeon: Dario Espinoza MD FACS    Assistant: Kimberly Mckinney. Asher (R4), DUANE Kelly DO (R2)    Pre-operative Diagnosis: Iatrogenic common bile duct injury    Post-operative Diagnosis: same    Location: Main OR    Procedure:   Exploratory laparotomy  2. Debridement of common bile duct  3. Completion cholecystectomy  4. Hepaticoduodenostomy   5. Placement of an 8F biliary stent to be removed in 6 weeks  6. Omental pedicle flap  7. Placement of ELANA drain    Anesthesia: GET    Indication: as above    EBL: 50mL    Procedure Details: The patient was brought into the operating room and placed in a supine position on the OR table. Prior to proceeding, a complete time out was taken and recorded in OpTime to include identification of the patient, identification of the procedure, identification of the operative site, presence of all required radiographs and/or equipment, and the timely administration of appropriate antibiotics. A dose of appropriate antibiotics was given within 60 minutes of the incision and will be discontinued within 24 hours per protocol. This was done under my direct supervision. After the induction of GET anesthesia, lines were placed by the anesthesiologist. The urinary bladder was catheterized. There was no tension on the axillae and all pressure points were padded. Sequential compression boots were used as were Kelly Huggers. The abdomen was prepped and draped in the usual sterile fashion. The usual right subcostal incision was made with a midline xiphoid extension. The skin was deepened with electrocautery and the abdominal cavity was opened. The falciform ligament was ligated and divided and mobilized off the liver to be used as an omental pedicle flap. The right upper quadrant was identified. There were clips on the distal cystic duct with common bile duct encompassed within it distally. The portal vein was exposed.   The hilum was

## 2023-07-29 LAB
ALBUMIN SERPL-MCNC: 3.7 G/DL (ref 3.5–5.2)
ALP SERPL-CCNC: 51 U/L (ref 40–129)
ALT SERPL-CCNC: 48 U/L (ref 0–40)
ANION GAP SERPL CALCULATED.3IONS-SCNC: 8 MMOL/L (ref 7–16)
AST SERPL-CCNC: 58 U/L (ref 0–39)
BASOPHILS # BLD: 0.05 K/UL (ref 0–0.2)
BASOPHILS NFR BLD: 0 % (ref 0–2)
BILIRUB DIRECT SERPL-MCNC: 0.4 MG/DL (ref 0–0.3)
BILIRUB INDIRECT SERPL-MCNC: 0.8 MG/DL (ref 0–1)
BILIRUB SERPL-MCNC: 1.2 MG/DL (ref 0–1.2)
BUN SERPL-MCNC: 19 MG/DL (ref 6–20)
CALCIUM SERPL-MCNC: 8.3 MG/DL (ref 8.6–10.2)
CHLORIDE SERPL-SCNC: 100 MMOL/L (ref 98–107)
CO2 SERPL-SCNC: 28 MMOL/L (ref 22–29)
CREAT SERPL-MCNC: 1.1 MG/DL (ref 0.7–1.2)
EOSINOPHIL # BLD: 0.03 K/UL (ref 0.05–0.5)
EOSINOPHILS RELATIVE PERCENT: 0 % (ref 0–6)
ERYTHROCYTE [DISTWIDTH] IN BLOOD BY AUTOMATED COUNT: 20.4 % (ref 11.5–15)
GFR SERPL CREATININE-BSD FRML MDRD: >60 ML/MIN/1.73M2
GLUCOSE SERPL-MCNC: 82 MG/DL (ref 74–99)
HCT VFR BLD AUTO: 53 % (ref 37–54)
HGB BLD-MCNC: 16.6 G/DL (ref 12.5–16.5)
IMM GRANULOCYTES # BLD AUTO: 0.09 K/UL (ref 0–0.58)
IMM GRANULOCYTES NFR BLD: 1 % (ref 0–5)
LYMPHOCYTES NFR BLD: 2.46 K/UL (ref 1.5–4)
LYMPHOCYTES RELATIVE PERCENT: 16 % (ref 20–42)
MCH RBC QN AUTO: 27.1 PG (ref 26–35)
MCHC RBC AUTO-ENTMCNC: 31.3 G/DL (ref 32–34.5)
MCV RBC AUTO: 86.6 FL (ref 80–99.9)
MONOCYTES NFR BLD: 1.89 K/UL (ref 0.1–0.95)
MONOCYTES NFR BLD: 13 % (ref 2–12)
NEUTROPHILS NFR BLD: 70 % (ref 43–80)
NEUTS SEG NFR BLD: 10.51 K/UL (ref 1.8–7.3)
PLATELET # BLD AUTO: 213 K/UL (ref 130–450)
PMV BLD AUTO: 11.2 FL (ref 7–12)
POTASSIUM SERPL-SCNC: 4.9 MMOL/L (ref 3.5–5)
PROT SERPL-MCNC: 6.5 G/DL (ref 6.4–8.3)
RBC # BLD AUTO: 6.12 M/UL (ref 3.8–5.8)
RBC # BLD: ABNORMAL 10*6/UL
SODIUM SERPL-SCNC: 136 MMOL/L (ref 132–146)
WBC OTHER # BLD: 15 K/UL (ref 4.5–11.5)

## 2023-07-29 PROCEDURE — 2140000000 HC CCU INTERMEDIATE R&B

## 2023-07-29 PROCEDURE — 2580000003 HC RX 258: Performed by: STUDENT IN AN ORGANIZED HEALTH CARE EDUCATION/TRAINING PROGRAM

## 2023-07-29 PROCEDURE — 36415 COLL VENOUS BLD VENIPUNCTURE: CPT

## 2023-07-29 PROCEDURE — 82668 ASSAY OF ERYTHROPOIETIN: CPT

## 2023-07-29 PROCEDURE — 81270 JAK2 GENE: CPT

## 2023-07-29 PROCEDURE — 6360000002 HC RX W HCPCS: Performed by: SURGERY

## 2023-07-29 PROCEDURE — 80048 BASIC METABOLIC PNL TOTAL CA: CPT

## 2023-07-29 PROCEDURE — 2580000003 HC RX 258: Performed by: SURGERY

## 2023-07-29 PROCEDURE — 85027 COMPLETE CBC AUTOMATED: CPT

## 2023-07-29 PROCEDURE — 80076 HEPATIC FUNCTION PANEL: CPT

## 2023-07-29 PROCEDURE — C9113 INJ PANTOPRAZOLE SODIUM, VIA: HCPCS

## 2023-07-29 PROCEDURE — 6360000002 HC RX W HCPCS: Performed by: STUDENT IN AN ORGANIZED HEALTH CARE EDUCATION/TRAINING PROGRAM

## 2023-07-29 PROCEDURE — 6360000002 HC RX W HCPCS

## 2023-07-29 RX ORDER — KETOROLAC TROMETHAMINE 30 MG/ML
15 INJECTION, SOLUTION INTRAMUSCULAR; INTRAVENOUS EVERY 6 HOURS
Status: DISCONTINUED | OUTPATIENT
Start: 2023-07-29 | End: 2023-07-31 | Stop reason: HOSPADM

## 2023-07-29 RX ADMIN — PANTOPRAZOLE SODIUM 40 MG: 40 INJECTION, POWDER, FOR SOLUTION INTRAVENOUS at 08:55

## 2023-07-29 RX ADMIN — METRONIDAZOLE 500 MG: 500 INJECTION, SOLUTION INTRAVENOUS at 20:54

## 2023-07-29 RX ADMIN — METRONIDAZOLE 500 MG: 500 INJECTION, SOLUTION INTRAVENOUS at 12:10

## 2023-07-29 RX ADMIN — METRONIDAZOLE 500 MG: 500 INJECTION, SOLUTION INTRAVENOUS at 04:42

## 2023-07-29 RX ADMIN — METHOCARBAMOL 500 MG: 100 INJECTION INTRAMUSCULAR; INTRAVENOUS at 00:10

## 2023-07-29 RX ADMIN — Medication: at 06:14

## 2023-07-29 RX ADMIN — METHOCARBAMOL 500 MG: 100 INJECTION INTRAMUSCULAR; INTRAVENOUS at 23:45

## 2023-07-29 RX ADMIN — ENOXAPARIN SODIUM 40 MG: 100 INJECTION SUBCUTANEOUS at 08:54

## 2023-07-29 RX ADMIN — METHOCARBAMOL 500 MG: 100 INJECTION INTRAMUSCULAR; INTRAVENOUS at 09:21

## 2023-07-29 RX ADMIN — LEVOFLOXACIN 750 MG: 5 INJECTION, SOLUTION INTRAVENOUS at 23:44

## 2023-07-29 RX ADMIN — SODIUM CHLORIDE, PRESERVATIVE FREE 10 ML: 5 INJECTION INTRAVENOUS at 20:46

## 2023-07-29 RX ADMIN — PANTOPRAZOLE SODIUM 40 MG: 40 INJECTION, POWDER, FOR SOLUTION INTRAVENOUS at 20:45

## 2023-07-29 RX ADMIN — SODIUM CHLORIDE, PRESERVATIVE FREE 10 ML: 5 INJECTION INTRAVENOUS at 09:05

## 2023-07-29 RX ADMIN — SODIUM CHLORIDE, POTASSIUM CHLORIDE, SODIUM LACTATE AND CALCIUM CHLORIDE: 600; 310; 30; 20 INJECTION, SOLUTION INTRAVENOUS at 20:55

## 2023-07-29 RX ADMIN — KETOROLAC TROMETHAMINE 15 MG: 30 INJECTION, SOLUTION INTRAMUSCULAR; INTRAVENOUS at 09:24

## 2023-07-29 RX ADMIN — LEVOFLOXACIN 750 MG: 5 INJECTION, SOLUTION INTRAVENOUS at 00:06

## 2023-07-29 ASSESSMENT — PAIN DESCRIPTION - DESCRIPTORS: DESCRIPTORS: ACHING;DISCOMFORT;SORE

## 2023-07-29 ASSESSMENT — PAIN - FUNCTIONAL ASSESSMENT: PAIN_FUNCTIONAL_ASSESSMENT: ACTIVITIES ARE NOT PREVENTED

## 2023-07-29 ASSESSMENT — PAIN DESCRIPTION - LOCATION: LOCATION: ABDOMEN

## 2023-07-29 ASSESSMENT — PAIN DESCRIPTION - ORIENTATION: ORIENTATION: RIGHT

## 2023-07-29 ASSESSMENT — PAIN SCALES - GENERAL
PAINLEVEL_OUTOF10: 6
PAINLEVEL_OUTOF10: 7

## 2023-07-29 NOTE — PLAN OF CARE
Problem: Discharge Planning  Goal: Discharge to home or other facility with appropriate resources  7/29/2023 1054 by Mitchell Cruz RN  Outcome: Progressing    Problem: Pain  Goal: Verbalizes/displays adequate comfort level or baseline comfort level  7/29/2023 1054 by Mitchell Cruz RN  Outcome: Progressing    Problem: ABCDS Injury Assessment  Goal: Absence of physical injury  7/29/2023 1054 by Mitchell Cruz RN  Outcome: Progressing    Problem: Safety - Adult  Goal: Free from fall injury  7/29/2023 1054 by Mitchell Cruz RN  Outcome: Progressing

## 2023-07-30 LAB
ALBUMIN SERPL-MCNC: 3.5 G/DL (ref 3.5–5.2)
ALP SERPL-CCNC: 50 U/L (ref 40–129)
ALT SERPL-CCNC: 38 U/L (ref 0–40)
ANION GAP SERPL CALCULATED.3IONS-SCNC: 10 MMOL/L (ref 7–16)
AST SERPL-CCNC: 43 U/L (ref 0–39)
BASOPHILS # BLD: 0.03 K/UL (ref 0–0.2)
BASOPHILS NFR BLD: 0 % (ref 0–2)
BILIRUB DIRECT SERPL-MCNC: 0.5 MG/DL (ref 0–0.3)
BILIRUB INDIRECT SERPL-MCNC: 0.6 MG/DL (ref 0–1)
BILIRUB SERPL-MCNC: 1.1 MG/DL (ref 0–1.2)
BUN SERPL-MCNC: 14 MG/DL (ref 6–20)
CALCIUM SERPL-MCNC: 8.4 MG/DL (ref 8.6–10.2)
CHLORIDE SERPL-SCNC: 98 MMOL/L (ref 98–107)
CO2 SERPL-SCNC: 26 MMOL/L (ref 22–29)
CREAT SERPL-MCNC: 0.9 MG/DL (ref 0.7–1.2)
EOSINOPHIL # BLD: 0.06 K/UL (ref 0.05–0.5)
EOSINOPHILS RELATIVE PERCENT: 1 % (ref 0–6)
ERYTHROCYTE [DISTWIDTH] IN BLOOD BY AUTOMATED COUNT: 19.6 % (ref 11.5–15)
GFR SERPL CREATININE-BSD FRML MDRD: >60 ML/MIN/1.73M2
GLUCOSE SERPL-MCNC: 78 MG/DL (ref 74–99)
HCT VFR BLD AUTO: 51.9 % (ref 37–54)
HGB BLD-MCNC: 16.5 G/DL (ref 12.5–16.5)
IMM GRANULOCYTES # BLD AUTO: 0.09 K/UL (ref 0–0.58)
IMM GRANULOCYTES NFR BLD: 1 % (ref 0–5)
LYMPHOCYTES NFR BLD: 2.42 K/UL (ref 1.5–4)
LYMPHOCYTES RELATIVE PERCENT: 19 % (ref 20–42)
MCH RBC QN AUTO: 27.3 PG (ref 26–35)
MCHC RBC AUTO-ENTMCNC: 31.8 G/DL (ref 32–34.5)
MCV RBC AUTO: 85.8 FL (ref 80–99.9)
MONOCYTES NFR BLD: 1.46 K/UL (ref 0.1–0.95)
MONOCYTES NFR BLD: 12 % (ref 2–12)
NEUTROPHILS NFR BLD: 68 % (ref 43–80)
NEUTS SEG NFR BLD: 8.55 K/UL (ref 1.8–7.3)
PLATELET # BLD AUTO: 216 K/UL (ref 130–450)
PMV BLD AUTO: 11 FL (ref 7–12)
POTASSIUM SERPL-SCNC: 4.5 MMOL/L (ref 3.5–5)
PROT SERPL-MCNC: 6.5 G/DL (ref 6.4–8.3)
RBC # BLD AUTO: 6.05 M/UL (ref 3.8–5.8)
SODIUM SERPL-SCNC: 134 MMOL/L (ref 132–146)
WBC OTHER # BLD: 12.6 K/UL (ref 4.5–11.5)

## 2023-07-30 PROCEDURE — 80048 BASIC METABOLIC PNL TOTAL CA: CPT

## 2023-07-30 PROCEDURE — 6360000002 HC RX W HCPCS

## 2023-07-30 PROCEDURE — 85027 COMPLETE CBC AUTOMATED: CPT

## 2023-07-30 PROCEDURE — 80076 HEPATIC FUNCTION PANEL: CPT

## 2023-07-30 PROCEDURE — 2580000003 HC RX 258: Performed by: STUDENT IN AN ORGANIZED HEALTH CARE EDUCATION/TRAINING PROGRAM

## 2023-07-30 PROCEDURE — 6370000000 HC RX 637 (ALT 250 FOR IP)

## 2023-07-30 PROCEDURE — C9113 INJ PANTOPRAZOLE SODIUM, VIA: HCPCS

## 2023-07-30 PROCEDURE — 2140000000 HC CCU INTERMEDIATE R&B

## 2023-07-30 PROCEDURE — 2580000003 HC RX 258

## 2023-07-30 PROCEDURE — 36415 COLL VENOUS BLD VENIPUNCTURE: CPT

## 2023-07-30 PROCEDURE — 6360000002 HC RX W HCPCS: Performed by: STUDENT IN AN ORGANIZED HEALTH CARE EDUCATION/TRAINING PROGRAM

## 2023-07-30 PROCEDURE — 6370000000 HC RX 637 (ALT 250 FOR IP): Performed by: STUDENT IN AN ORGANIZED HEALTH CARE EDUCATION/TRAINING PROGRAM

## 2023-07-30 RX ORDER — OXYCODONE HYDROCHLORIDE 10 MG/1
10 TABLET ORAL EVERY 4 HOURS PRN
Status: DISCONTINUED | OUTPATIENT
Start: 2023-07-30 | End: 2023-07-31 | Stop reason: HOSPADM

## 2023-07-30 RX ORDER — SODIUM CHLORIDE, SODIUM LACTATE, POTASSIUM CHLORIDE, CALCIUM CHLORIDE 600; 310; 30; 20 MG/100ML; MG/100ML; MG/100ML; MG/100ML
INJECTION, SOLUTION INTRAVENOUS CONTINUOUS
Status: DISCONTINUED | OUTPATIENT
Start: 2023-07-30 | End: 2023-07-31 | Stop reason: HOSPADM

## 2023-07-30 RX ORDER — HYDROMORPHONE HYDROCHLORIDE 1 MG/ML
0.5 INJECTION, SOLUTION INTRAMUSCULAR; INTRAVENOUS; SUBCUTANEOUS EVERY 4 HOURS PRN
Status: DISCONTINUED | OUTPATIENT
Start: 2023-07-30 | End: 2023-07-31 | Stop reason: HOSPADM

## 2023-07-30 RX ORDER — OXYCODONE HYDROCHLORIDE 5 MG/1
5 TABLET ORAL EVERY 4 HOURS PRN
Status: DISCONTINUED | OUTPATIENT
Start: 2023-07-30 | End: 2023-07-31 | Stop reason: HOSPADM

## 2023-07-30 RX ADMIN — METRONIDAZOLE 500 MG: 500 INJECTION, SOLUTION INTRAVENOUS at 20:25

## 2023-07-30 RX ADMIN — METRONIDAZOLE 500 MG: 500 INJECTION, SOLUTION INTRAVENOUS at 04:45

## 2023-07-30 RX ADMIN — SODIUM CHLORIDE, PRESERVATIVE FREE 10 ML: 5 INJECTION INTRAVENOUS at 20:18

## 2023-07-30 RX ADMIN — ONDANSETRON 4 MG: 4 TABLET, ORALLY DISINTEGRATING ORAL at 09:52

## 2023-07-30 RX ADMIN — METHOCARBAMOL 500 MG: 100 INJECTION INTRAMUSCULAR; INTRAVENOUS at 09:42

## 2023-07-30 RX ADMIN — ENOXAPARIN SODIUM 40 MG: 100 INJECTION SUBCUTANEOUS at 09:37

## 2023-07-30 RX ADMIN — METHOCARBAMOL 500 MG: 100 INJECTION INTRAMUSCULAR; INTRAVENOUS at 16:33

## 2023-07-30 RX ADMIN — METRONIDAZOLE 500 MG: 500 INJECTION, SOLUTION INTRAVENOUS at 12:40

## 2023-07-30 RX ADMIN — OXYCODONE HYDROCHLORIDE 10 MG: 10 TABLET ORAL at 09:52

## 2023-07-30 RX ADMIN — PANTOPRAZOLE SODIUM 40 MG: 40 INJECTION, POWDER, FOR SOLUTION INTRAVENOUS at 20:18

## 2023-07-30 RX ADMIN — OXYCODONE HYDROCHLORIDE 10 MG: 10 TABLET ORAL at 18:55

## 2023-07-30 RX ADMIN — OXYCODONE HYDROCHLORIDE 10 MG: 10 TABLET ORAL at 14:35

## 2023-07-30 RX ADMIN — LEVOFLOXACIN 750 MG: 5 INJECTION, SOLUTION INTRAVENOUS at 23:33

## 2023-07-30 RX ADMIN — PANTOPRAZOLE SODIUM 40 MG: 40 INJECTION, POWDER, FOR SOLUTION INTRAVENOUS at 09:37

## 2023-07-30 RX ADMIN — SODIUM CHLORIDE, POTASSIUM CHLORIDE, SODIUM LACTATE AND CALCIUM CHLORIDE: 600; 310; 30; 20 INJECTION, SOLUTION INTRAVENOUS at 09:48

## 2023-07-30 ASSESSMENT — PAIN SCALES - GENERAL
PAINLEVEL_OUTOF10: 2
PAINLEVEL_OUTOF10: 8
PAINLEVEL_OUTOF10: 8
PAINLEVEL_OUTOF10: 3
PAINLEVEL_OUTOF10: 7

## 2023-07-30 ASSESSMENT — PAIN DESCRIPTION - ORIENTATION: ORIENTATION: MID

## 2023-07-30 ASSESSMENT — PAIN DESCRIPTION - PAIN TYPE: TYPE: SURGICAL PAIN

## 2023-07-30 ASSESSMENT — PAIN DESCRIPTION - LOCATION
LOCATION: ABDOMEN
LOCATION: ABDOMEN;INCISION

## 2023-07-30 ASSESSMENT — PAIN DESCRIPTION - DESCRIPTORS
DESCRIPTORS: ACHING;CRAMPING;DISCOMFORT
DESCRIPTORS: ACHING;DISCOMFORT;GNAWING
DESCRIPTORS: ACHING;DISCOMFORT;PENETRATING

## 2023-07-30 ASSESSMENT — PAIN - FUNCTIONAL ASSESSMENT: PAIN_FUNCTIONAL_ASSESSMENT: ACTIVITIES ARE NOT PREVENTED

## 2023-07-30 NOTE — PATIENT CARE CONFERENCE
P Quality Flow/Interdisciplinary Rounds Progress Note        Quality Flow Rounds held on July 30, 2023    Disciplines Attending:  Bedside Nurse, , , and Nursing Unit Leadership    Pual Wheeler was admitted on 7/27/2023  4:31 PM    Anticipated Discharge Date:       Disposition:    Kt Score:  Kt Scale Score: 19    Readmission Risk              Risk of Unplanned Readmission:  5           Discussed patient goal for the day, patient clinical progression, and barriers to discharge.   The following Goal(s) of the Day/Commitment(s) have been identified:  ambulate/downgrade      Roger Ibarra RN  July 30, 2023

## 2023-07-30 NOTE — PLAN OF CARE
Problem: Pain  Goal: Verbalizes/displays adequate comfort level or baseline comfort level  7/29/2023 2251 by Miroslava Mishra RN  Outcome: Progressing     Problem: Safety - Adult  Goal: Free from fall injury  7/29/2023 2251 by Miroslava Mishra RN  Outcome: Progressing     Problem: ABCDS Injury Assessment  Goal: Absence of physical injury  7/29/2023 2251 by Miroslava Mishra RN  Outcome: Progressing     Problem: Discharge Planning  Goal: Discharge to home or other facility with appropriate resources  7/29/2023 2251 by Miroslava Mishra RN  Outcome: Progressing

## 2023-07-30 NOTE — PLAN OF CARE
Problem: Discharge Planning  Goal: Discharge to home or other facility with appropriate resources  7/30/2023 1228 by Harrison Del Rosario RN  Outcome: Progressing       Problem: Pain  Goal: Verbalizes/displays adequate comfort level or baseline comfort level  7/30/2023 1228 by Harrison Del Rosario RN  Outcome: Progressing       Problem: ABCDS Injury Assessment  Goal: Absence of physical injury  7/30/2023 1228 by Harrison Del Rosario RN  Outcome: Progressing    Problem: Safety - Adult  Goal: Free from fall injury  7/30/2023 1228 by Harrison Del Rosario RN  Outcome: Progressing

## 2023-07-30 NOTE — DISCHARGE INSTRUCTIONS
Post op Abdominal Surgery Instructions    1. May shower daily with Dial soap and water. Pat incisions dry. No lotions, creams or powders. No tub baths/hot tubs or pools. 2. No lifting [id number]; no pushing/pulling. No driving until after seen by surgeon in the office. May go up and down a flight of stairs 1-2X daily. Walk at least 3x daily. No sexual activity until after seen in the office. 3.  Okay for Diet    4. Take all medications as listed on your Discharge Instruction sheet    5. Call Your Doctor If Any of the Following Occurs     Signs of infection, including fever and chills   Redness, swelling, increasing pain, excessive bleeding, or discharge at the incision site   Cough, shortness of breath, chest pain   Increased abdominal pain   Nausea and/or vomiting that does not resolve off narcotics. Pain, burning, urgency or frequency of urination, or blood in the urine   Pain and/or swelling in your feet, calves, or legs   Dark urine, light stools, or evidence of jaundice (yellowing of the skin or eyes). In case of an emergency,    CALL 911  immediately. 6. Follow up with Dr. Ananda Radford in 2 weeks, please call his office ([phone]) upon discharge to schedule an appointment.

## 2023-07-31 VITALS
HEIGHT: 65 IN | BODY MASS INDEX: 32.09 KG/M2 | RESPIRATION RATE: 16 BRPM | HEART RATE: 97 BPM | OXYGEN SATURATION: 97 % | DIASTOLIC BLOOD PRESSURE: 88 MMHG | SYSTOLIC BLOOD PRESSURE: 148 MMHG | WEIGHT: 192.6 LBS | TEMPERATURE: 98.9 F

## 2023-07-31 LAB
ALBUMIN SERPL-MCNC: 3.2 G/DL (ref 3.5–5.2)
ALP SERPL-CCNC: 45 U/L (ref 40–129)
ALT SERPL-CCNC: 31 U/L (ref 0–40)
ANION GAP SERPL CALCULATED.3IONS-SCNC: 9 MMOL/L (ref 7–16)
AST SERPL-CCNC: 30 U/L (ref 0–39)
BASOPHILS # BLD: 0.03 K/UL (ref 0–0.2)
BASOPHILS NFR BLD: 0 % (ref 0–2)
BILIRUB DIRECT SERPL-MCNC: 0.3 MG/DL (ref 0–0.3)
BILIRUB INDIRECT SERPL-MCNC: 0.6 MG/DL (ref 0–1)
BILIRUB SERPL-MCNC: 0.9 MG/DL (ref 0–1.2)
BUN SERPL-MCNC: 12 MG/DL (ref 6–20)
CALCIUM SERPL-MCNC: 8.8 MG/DL (ref 8.6–10.2)
CHLORIDE SERPL-SCNC: 100 MMOL/L (ref 98–107)
CO2 SERPL-SCNC: 28 MMOL/L (ref 22–29)
CREAT SERPL-MCNC: 1 MG/DL (ref 0.7–1.2)
EOSINOPHIL # BLD: 0.1 K/UL (ref 0.05–0.5)
EOSINOPHILS RELATIVE PERCENT: 1 % (ref 0–6)
EPO SERPL-ACNC: 27 MU/ML (ref 4–27)
ERYTHROCYTE [DISTWIDTH] IN BLOOD BY AUTOMATED COUNT: 19.3 % (ref 11.5–15)
GFR SERPL CREATININE-BSD FRML MDRD: >60 ML/MIN/1.73M2
GLUCOSE SERPL-MCNC: 73 MG/DL (ref 74–99)
HAV IGM SERPL QL IA: NONREACTIVE
HBV CORE IGM SERPL QL IA: NONREACTIVE
HBV SURFACE AG SERPL QL IA: NONREACTIVE
HCT VFR BLD AUTO: 50.4 % (ref 37–54)
HCV AB SERPL QL IA: NONREACTIVE
HGB BLD-MCNC: 15.8 G/DL (ref 12.5–16.5)
IMM GRANULOCYTES # BLD AUTO: 0.1 K/UL (ref 0–0.58)
IMM GRANULOCYTES NFR BLD: 1 % (ref 0–5)
LYMPHOCYTES NFR BLD: 1.97 K/UL (ref 1.5–4)
LYMPHOCYTES RELATIVE PERCENT: 19 % (ref 20–42)
MCH RBC QN AUTO: 26.9 PG (ref 26–35)
MCHC RBC AUTO-ENTMCNC: 31.3 G/DL (ref 32–34.5)
MCV RBC AUTO: 85.9 FL (ref 80–99.9)
MONOCYTES NFR BLD: 1.1 K/UL (ref 0.1–0.95)
MONOCYTES NFR BLD: 11 % (ref 2–12)
NEUTROPHILS NFR BLD: 68 % (ref 43–80)
NEUTS SEG NFR BLD: 7.19 K/UL (ref 1.8–7.3)
PLATELET # BLD AUTO: 221 K/UL (ref 130–450)
PMV BLD AUTO: 10.8 FL (ref 7–12)
POTASSIUM SERPL-SCNC: 4.5 MMOL/L (ref 3.5–5)
PROT SERPL-MCNC: 6.2 G/DL (ref 6.4–8.3)
RBC # BLD AUTO: 5.87 M/UL (ref 3.8–5.8)
SODIUM SERPL-SCNC: 137 MMOL/L (ref 132–146)
WBC OTHER # BLD: 10.5 K/UL (ref 4.5–11.5)

## 2023-07-31 PROCEDURE — 36415 COLL VENOUS BLD VENIPUNCTURE: CPT

## 2023-07-31 PROCEDURE — 6360000002 HC RX W HCPCS: Performed by: SURGERY

## 2023-07-31 PROCEDURE — 2580000003 HC RX 258: Performed by: STUDENT IN AN ORGANIZED HEALTH CARE EDUCATION/TRAINING PROGRAM

## 2023-07-31 PROCEDURE — 6370000000 HC RX 637 (ALT 250 FOR IP)

## 2023-07-31 PROCEDURE — 6360000002 HC RX W HCPCS: Performed by: STUDENT IN AN ORGANIZED HEALTH CARE EDUCATION/TRAINING PROGRAM

## 2023-07-31 PROCEDURE — 82248 BILIRUBIN DIRECT: CPT

## 2023-07-31 PROCEDURE — 85027 COMPLETE CBC AUTOMATED: CPT

## 2023-07-31 PROCEDURE — C9113 INJ PANTOPRAZOLE SODIUM, VIA: HCPCS

## 2023-07-31 PROCEDURE — 6360000002 HC RX W HCPCS

## 2023-07-31 PROCEDURE — 80053 COMPREHEN METABOLIC PANEL: CPT

## 2023-07-31 RX ORDER — SENNA AND DOCUSATE SODIUM 50; 8.6 MG/1; MG/1
2 TABLET, FILM COATED ORAL DAILY
Qty: 28 TABLET | Refills: 0 | Status: SHIPPED | OUTPATIENT
Start: 2023-07-31 | End: 2023-08-14

## 2023-07-31 RX ORDER — CYCLOBENZAPRINE HCL 10 MG
10 TABLET ORAL 3 TIMES DAILY PRN
Qty: 45 TABLET | Refills: 0 | Status: SHIPPED | OUTPATIENT
Start: 2023-07-31 | End: 2023-08-15

## 2023-07-31 RX ORDER — SUCRALFATE 1 G/1
1 TABLET ORAL 2 TIMES DAILY
Qty: 60 TABLET | Refills: 0 | Status: SHIPPED | OUTPATIENT
Start: 2023-07-31 | End: 2023-08-30

## 2023-07-31 RX ORDER — PANTOPRAZOLE SODIUM 40 MG/1
40 TABLET, DELAYED RELEASE ORAL 2 TIMES DAILY
Qty: 120 TABLET | Refills: 0 | Status: SHIPPED | OUTPATIENT
Start: 2023-07-31 | End: 2023-09-29

## 2023-07-31 RX ORDER — OXYCODONE HYDROCHLORIDE AND ACETAMINOPHEN 5; 325 MG/1; MG/1
1 TABLET ORAL EVERY 6 HOURS PRN
Qty: 28 TABLET | Refills: 0 | Status: SHIPPED | OUTPATIENT
Start: 2023-07-31 | End: 2023-08-07

## 2023-07-31 RX ADMIN — OXYCODONE HYDROCHLORIDE 10 MG: 10 TABLET ORAL at 00:25

## 2023-07-31 RX ADMIN — SODIUM CHLORIDE, PRESERVATIVE FREE 10 ML: 5 INJECTION INTRAVENOUS at 08:43

## 2023-07-31 RX ADMIN — PANTOPRAZOLE SODIUM 40 MG: 40 INJECTION, POWDER, FOR SOLUTION INTRAVENOUS at 08:42

## 2023-07-31 RX ADMIN — METRONIDAZOLE 500 MG: 500 INJECTION, SOLUTION INTRAVENOUS at 05:18

## 2023-07-31 RX ADMIN — ENOXAPARIN SODIUM 40 MG: 100 INJECTION SUBCUTANEOUS at 08:43

## 2023-07-31 RX ADMIN — OXYCODONE HYDROCHLORIDE 10 MG: 10 TABLET ORAL at 05:59

## 2023-07-31 RX ADMIN — KETOROLAC TROMETHAMINE 15 MG: 30 INJECTION, SOLUTION INTRAMUSCULAR; INTRAVENOUS at 08:42

## 2023-07-31 NOTE — CARE COORDINATION
7/31/23  Transition of Care Update. Patient is s/p exlap w hepaticoduodenostomy from 7/27 after iatrogenic common bile duct injury during cholecystectomy. Patients ELANA drain will be removed today and patient will discharge home with no needs. DECLAN/JUAN JOSÉ to follow.     Electronically signed by SEBASTIAN Torres on 7/31/2023 at 9:31 AM

## 2023-08-01 ENCOUNTER — TELEPHONE (OUTPATIENT)
Dept: HEMATOLOGY | Age: 58
End: 2023-08-01

## 2023-08-01 LAB — SURGICAL PATHOLOGY REPORT: NORMAL

## 2023-08-01 NOTE — TELEPHONE ENCOUNTER
The patient called and stated that the pharmacist told him that he should take protonix due to having a PCN allergy. I spoke directly with Dr Jesusita Marin he said that taking that medication has nothing to do with his PCN allergy, that he was taking it in the hospital and should continue to take it. I also let the patient know that he should be on a regular diet but to refrain from anything spicy.  The patient confirmed all of the above  Electronically signed by Todd Bahena MA on 8/1/2023 at 3:08 PM'

## 2023-08-08 LAB
JAK2 P.V617F BLD/T QL: NOT DETECTED
SPECIMEN SOURCE: NORMAL

## 2023-08-15 ENCOUNTER — OFFICE VISIT (OUTPATIENT)
Dept: SURGERY | Age: 58
End: 2023-08-15

## 2023-08-15 VITALS
HEART RATE: 97 BPM | WEIGHT: 191 LBS | SYSTOLIC BLOOD PRESSURE: 119 MMHG | HEIGHT: 65 IN | DIASTOLIC BLOOD PRESSURE: 78 MMHG | TEMPERATURE: 98.9 F | OXYGEN SATURATION: 97 % | BODY MASS INDEX: 31.82 KG/M2 | RESPIRATION RATE: 16 BRPM

## 2023-08-15 DIAGNOSIS — K91.81 INJURY OF COMMON BILE DUCT DURING OPERATIVE PROCEDURE: Primary | ICD-10-CM

## 2023-08-15 PROCEDURE — 99024 POSTOP FOLLOW-UP VISIT: CPT | Performed by: TRANSPLANT SURGERY

## 2023-08-18 ENCOUNTER — TELEPHONE (OUTPATIENT)
Dept: HEMATOLOGY | Age: 58
End: 2023-08-18

## 2023-08-18 NOTE — TELEPHONE ENCOUNTER
The patient is scheduled for his EGD/stent removal on 09/14/23 at 8:00 am [address] [facility]. I called the patients insurance and was told that clinicals need to be faxed. I faxed all clinicals this morning. I did not call the patient just yet due to his [place].  I wanted to make sure that this procedure would be covered before letting the patient know  Electronically signed by Macey Ortiz MA on 8/18/2023 at 12:36 PM

## 2023-08-28 ENCOUNTER — PREP FOR PROCEDURE (OUTPATIENT)
Dept: HEMATOLOGY | Age: 58
End: 2023-08-28

## 2023-08-28 RX ORDER — SODIUM CHLORIDE 0.9 % (FLUSH) 0.9 %
5-40 SYRINGE (ML) INJECTION EVERY 12 HOURS SCHEDULED
Status: CANCELLED | OUTPATIENT
Start: 2023-08-28

## 2023-08-28 RX ORDER — SODIUM CHLORIDE 0.9 % (FLUSH) 0.9 %
5-40 SYRINGE (ML) INJECTION PRN
Status: CANCELLED | OUTPATIENT
Start: 2023-08-28

## 2023-08-28 RX ORDER — SODIUM CHLORIDE 9 MG/ML
25 INJECTION, SOLUTION INTRAVENOUS PRN
Status: CANCELLED | OUTPATIENT
Start: 2023-08-28

## 2023-08-29 ENCOUNTER — OFFICE VISIT (OUTPATIENT)
Dept: SURGERY | Age: 58
End: 2023-08-29

## 2023-08-29 VITALS
HEIGHT: 65 IN | HEART RATE: 97 BPM | DIASTOLIC BLOOD PRESSURE: 84 MMHG | RESPIRATION RATE: 16 BRPM | TEMPERATURE: 98.9 F | OXYGEN SATURATION: 97 % | BODY MASS INDEX: 30.49 KG/M2 | WEIGHT: 183 LBS | SYSTOLIC BLOOD PRESSURE: 125 MMHG

## 2023-08-29 DIAGNOSIS — Z09 SURGICAL FOLLOW-UP CARE: Primary | ICD-10-CM

## 2023-08-29 PROCEDURE — 99024 POSTOP FOLLOW-UP VISIT: CPT | Performed by: TRANSPLANT SURGERY

## 2023-08-29 RX ORDER — PANTOPRAZOLE SODIUM 40 MG/1
40 TABLET, DELAYED RELEASE ORAL 2 TIMES DAILY
Qty: 60 TABLET | Refills: 1 | Status: SHIPPED | OUTPATIENT
Start: 2023-08-29 | End: 2023-10-28

## 2023-09-11 NOTE — PROGRESS NOTES
Requested MARINO PCP Dr Africa Holman    Addendum: MARINO received 9/12/23 reviewed and attached to chart

## 2023-09-11 NOTE — PROGRESS NOTES
[address] [employer] PRE-ADMISSION TESTING INSTRUCTIONS      ARRIVAL INSTRUCTIONS:  [x] Parking the day of Surgery is located in the Main Entrance lot. Upon entering the main door make an immediate right to the surgery reception desk. [x] Bring photo ID and insurance card    [] Bring in a copy of Living will or Durable Power of  papers. [x] Please be sure to arrange for responsible adult to provide transportation to and from the hospital    [x] Please arrange for responsible adult to be with you for the 24 hour period post procedure due to having anesthesia    [x] If you awake am of surgery not feeling well or have temperature >100 please call [phone]    GENERAL INSTRUCTIONS:    [x] Nothing by mouth after midnight, including gum, candy, mints or water    [x] You may brush your teeth, but do not swallow any water    [x] Take medications as instructed with 1-2 oz of water    [x] Stop herbal supplements and vitamins 5 days prior to procedure    [x] Follow preop dosing of blood thinners per physician instructions    [] Take 1/2 dose of evening insulin, but no insulin after midnight    [] No oral diabetic medications after midnight    [] If diabetic and have low blood sugar or feel symptomatic, take 1-2oz apple juice only    [] Bring inhalers day of surgery    [] Bring C-PAP/ Bi-Pap day of surgery    [] Bring urine specimen day of surgery    [x] Shower or bath with soap, lather and rinse well, AM of Surgery, no lotion, powders or creams to surgical site    [] Follow bowel prep as instructed per surgeon    [x] No tobacco products within 24 hours of surgery     [x] No alcohol or illegal drug use within 24 hours of surgery.     [x] Jewelry, body piercing's, eyeglasses, contact lenses and dentures are not permitted into surgery (bring cases)      [x] Please do not wear any nail polish, make up or hair products on the day of surgery    [x] You can expect a call the business day prior to

## 2023-09-13 ASSESSMENT — ENCOUNTER SYMPTOMS
NAUSEA: 0
EYE PAIN: 0
BACK PAIN: 0
DIARRHEA: 0
CONSTIPATION: 0
EYE DISCHARGE: 0
SHORTNESS OF BREATH: 0
PHOTOPHOBIA: 0
ABDOMINAL PAIN: 0
VOMITING: 0
BLOOD IN STOOL: 0

## 2023-09-13 NOTE — H&P
Hepatobiliary and Pancreatic Surgery Attending History and Physical    Patient's Name/Date of Birth: Srinivas Martinez /1965 (43 y.o.)    Date: September 13, 2023     CC:stent removal    HPI:  Patient states that he is eating better but states that he does feel worse after eating. He is eating but nothing big. He has a lot indigestion. He states that it is worse at night. He does have some numbness over the right side of his incision. He was transferred in from an OSH with an iatrogenic common hepatic duct injury. Underwent a biliary reconstruction. History reviewed. No pertinent past medical history. Past Surgical History:   Procedure Laterality Date    LIVER RESECTION N/A 07/27/2023    Bile Duct Repair, Hepaticoduodenostomy with Cholecystectomy performed by Too Oseguera MD at [address] [place] [address] ARTHROSCOPY DISTAL CLAVICLE EXCISION AND OPEN ROTATOR CUFF REPAIR Right     2018       No current facility-administered medications for this encounter. Current Outpatient Medications   Medication Sig Dispense Refill    pantoprazole (PROTONIX) 40 MG tablet Take 1 tablet by mouth in the morning and at bedtime 60 tablet 1       Allergies   Allergen Reactions    Penicillins Other (See Comments)     \"Chokes Me\"       History reviewed. No pertinent family history.     Social History     Socioeconomic History    Marital status: Unknown     Spouse name: Not on file    Number of children: Not on file    Years of education: Not on file    Highest education level: Not on file   Occupational History    Not on file   Tobacco Use    Smoking status: Every Day     Types: Cigars     Start date: 1/1/1999    Smokeless tobacco: Never   Vaping Use    Vaping Use: Never used   Substance and Sexual Activity    Alcohol use: Not Currently    Drug use: Never    Sexual activity: Defer   Other Topics Concern    Not on file   Social History Narrative    Not on file     Social Determinants of Health     Financial with removal of biliary stents and wish to proceed with surgery. Thank you for the consultation allowing me to take part in [facility].      July David M.D.  9/13/2023  1:40 PM

## 2023-09-14 ENCOUNTER — HOSPITAL ENCOUNTER (OUTPATIENT)
Age: 58
Setting detail: OUTPATIENT SURGERY
Discharge: HOME | End: 2023-09-14
Attending: TRANSPLANT SURGERY | Admitting: TRANSPLANT SURGERY
Payer: MEDICAID

## 2023-09-14 ENCOUNTER — ANESTHESIA EVENT (OUTPATIENT)
Dept: ENDOSCOPY | Age: 58
End: 2023-09-14
Payer: COMMERCIAL

## 2023-09-14 ENCOUNTER — ANESTHESIA (OUTPATIENT)
Dept: ENDOSCOPY | Age: 58
End: 2023-09-14
Payer: COMMERCIAL

## 2023-09-14 VITALS
WEIGHT: 185 LBS | HEIGHT: 65 IN | TEMPERATURE: 96.8 F | OXYGEN SATURATION: 95 % | SYSTOLIC BLOOD PRESSURE: 116 MMHG | BODY MASS INDEX: 30.82 KG/M2 | DIASTOLIC BLOOD PRESSURE: 75 MMHG | HEART RATE: 73 BPM | RESPIRATION RATE: 16 BRPM

## 2023-09-14 DIAGNOSIS — Z48.815 ENCOUNTER FOR SURGICAL AFTERCARE FOLLOWING SURGERY OF DIGESTIVE SYSTEM: ICD-10-CM

## 2023-09-14 PROCEDURE — 2709999900 HC NON-CHARGEABLE SUPPLY: Performed by: TRANSPLANT SURGERY

## 2023-09-14 PROCEDURE — 3700000000 HC ANESTHESIA ATTENDED CARE: Performed by: TRANSPLANT SURGERY

## 2023-09-14 PROCEDURE — 7100000011 HC PHASE II RECOVERY - ADDTL 15 MIN: Performed by: TRANSPLANT SURGERY

## 2023-09-14 PROCEDURE — 3700000001 HC ADD 15 MINUTES (ANESTHESIA): Performed by: TRANSPLANT SURGERY

## 2023-09-14 PROCEDURE — 88342 IMHCHEM/IMCYTCHM 1ST ANTB: CPT

## 2023-09-14 PROCEDURE — 88305 TISSUE EXAM BY PATHOLOGIST: CPT

## 2023-09-14 PROCEDURE — 43239 EGD BIOPSY SINGLE/MULTIPLE: CPT | Performed by: TRANSPLANT SURGERY

## 2023-09-14 PROCEDURE — 3609012400 HC EGD TRANSORAL BIOPSY SINGLE/MULTIPLE: Performed by: TRANSPLANT SURGERY

## 2023-09-14 PROCEDURE — 2580000003 HC RX 258: Performed by: NURSE ANESTHETIST, CERTIFIED REGISTERED

## 2023-09-14 PROCEDURE — 7100000010 HC PHASE II RECOVERY - FIRST 15 MIN: Performed by: TRANSPLANT SURGERY

## 2023-09-14 RX ORDER — PANTOPRAZOLE SODIUM 40 MG/1
40 TABLET, DELAYED RELEASE ORAL DAILY
Qty: 60 TABLET | Refills: 5 | Status: SHIPPED | OUTPATIENT
Start: 2023-09-14 | End: 2024-09-08

## 2023-09-14 RX ORDER — SODIUM CHLORIDE 0.9 % (FLUSH) 0.9 %
5-40 SYRINGE (ML) INJECTION PRN
Status: DISCONTINUED | OUTPATIENT
Start: 2023-09-14 | End: 2023-09-14 | Stop reason: HOSPADM

## 2023-09-14 RX ORDER — LIDOCAINE HYDROCHLORIDE 20 MG/ML
INJECTION, SOLUTION INFILTRATION; PERINEURAL PRN
Status: DISCONTINUED | OUTPATIENT
Start: 2023-09-14 | End: 2023-09-14 | Stop reason: SDUPTHER

## 2023-09-14 RX ORDER — SODIUM CHLORIDE 9 MG/ML
25 INJECTION, SOLUTION INTRAVENOUS PRN
Status: DISCONTINUED | OUTPATIENT
Start: 2023-09-14 | End: 2023-09-14 | Stop reason: HOSPADM

## 2023-09-14 RX ORDER — SODIUM CHLORIDE 9 MG/ML
INJECTION, SOLUTION INTRAVENOUS CONTINUOUS PRN
Status: DISCONTINUED | OUTPATIENT
Start: 2023-09-14 | End: 2023-09-14 | Stop reason: SDUPTHER

## 2023-09-14 RX ORDER — SODIUM CHLORIDE 0.9 % (FLUSH) 0.9 %
5-40 SYRINGE (ML) INJECTION EVERY 12 HOURS SCHEDULED
Status: DISCONTINUED | OUTPATIENT
Start: 2023-09-14 | End: 2023-09-14 | Stop reason: HOSPADM

## 2023-09-14 RX ORDER — PROPOFOL 10 MG/ML
INJECTION, EMULSION INTRAVENOUS PRN
Status: DISCONTINUED | OUTPATIENT
Start: 2023-09-14 | End: 2023-09-14 | Stop reason: SDUPTHER

## 2023-09-14 RX ADMIN — LIDOCAINE HYDROCHLORIDE 40 MG: 20 INJECTION, SOLUTION INFILTRATION; PERINEURAL at 07:53

## 2023-09-14 RX ADMIN — PROPOFOL 240 MG: 10 INJECTION, EMULSION INTRAVENOUS at 07:53

## 2023-09-14 RX ADMIN — SODIUM CHLORIDE: 9 INJECTION, SOLUTION INTRAVENOUS at 07:51

## 2023-09-14 ASSESSMENT — PAIN - FUNCTIONAL ASSESSMENT: PAIN_FUNCTIONAL_ASSESSMENT: 0-10

## 2023-09-14 ASSESSMENT — PAIN SCALES - GENERAL
PAINLEVEL_OUTOF10: 0

## 2023-09-14 ASSESSMENT — LIFESTYLE VARIABLES: SMOKING_STATUS: 1

## 2023-09-14 NOTE — ANESTHESIA POSTPROCEDURE EVALUATION
Department of Anesthesiology  Postprocedure Note    Patient: Randolph Cruz  MRN: [id number]  YOB: 1965  Date of evaluation: 9/14/2023      Procedure Summary     Date: 09/14/23 Room / Location: [facility] / [facility]    Anesthesia Start: 0160 Anesthesia Stop: Corlis Mend    Procedure: EGD BIOPSY Diagnosis:       Encounter for surgical aftercare following surgery of digestive system      (Encounter for surgical aftercare following surgery of digestive system [Z48.815])    Surgeons: Pablo Fernández MD Responsible Provider: Maykel George MD    Anesthesia Type: MAC ASA Status: 2          Anesthesia Type: No value filed.     Chip Phase I: Chip Score: 10    Chip Phase II:        Anesthesia Post Evaluation    Patient location during evaluation: bedside  Patient participation: complete - patient participated  Level of consciousness: awake and alert  Airway patency: patent  Nausea & Vomiting: no vomiting and no nausea  Complications: no  Cardiovascular status: hemodynamically stable  Respiratory status: spontaneous ventilation and room air  Hydration status: stable  Pain management: adequate

## 2023-09-14 NOTE — OP NOTE
PROCEDURE NOTE    DATE OF PROCEDURE: 9/14/23     SURGEON: lCaire Mcgovern MD FACS    ASSISTANT: none    PREOPERATIVE DIAGNOSIS: iatrogenic common bile duct injury, s/p hepaticoduodenostomy over a biliary stent    POSTOPERATIVE DIAGNOSIS: antral gastritis, patent anastomosis    OPERATION: esophagogastroduodenoscopy with antral biospies    ANESTHESIA: MAC     ESTIMATED BLOOD LOSS:  less than 50     COMPLICATIONS: None    SPECIMENS:  Was Obtained:     HISTORY: The patient is a 56y.o. year old male with history of above preop diagnosis. I recommended esophagogastroduodenoscopy with possible biopsy and I explained the risk, benefits, expected outcome, and alternatives to the procedure. Risks included but are not limited to bleeding, infection, respiratory distress, hypotension, and perforation of the esophagus, stomach, or duodenum. Patient understands and is in agreement. PROCEDURE: The patient was connected to the monitors and given supplemental oxygen by nasal cannula. The patient was sedated. The gastroscope was inserted orally and advanced under direct vision through the esophagus, through the stomach, through the pylorus, and into the descending duodenum. Findings:  Duodenum:     Descending: normal    Bulb: patent biliary duodenal anstomosis, the biliary stent had passed    Stomach:    Antrum: antral gastritis, cold forcep biospies were taken to rule otu H. Pylori    Body: normal    Fundus: normal    Esophagus: normal    Larynx: normal    The scope was removed and the patient tolerated the procedure well.      IMPRESSION/PLAN:   Protonix once a day  Follow up biopsy results    Electronically signed by Li Augustin MD on 9/14/23 at 8:09 AM EDT

## 2023-09-14 NOTE — PROGRESS NOTES
CLINICAL PHARMACY NOTE: MEDS TO BEDS    Total # of Prescriptions Filled: 1   The following medications were delivered to the patient:  Pantoprazole 40 mg     Additional Documentation:   Picked up

## 2023-09-14 NOTE — DISCHARGE INSTRUCTIONS
POST-PROCEDURE INSTRUCTIONS FOR EGD    Gabriela Ortega  9/14/2023      You underwent an EGD (upper endoscopy) today. You were found to have antral gastritis - biopsies were taken to rule out H. Pylori. Patent hepaticoduodenal anastomosis, the biliary stent had passed (this will come out in your stool)     You may experience: Increased amount of gas. Slight abdominal bloating and/or cramping. Drowsiness and/or forgetfulness. Activity: no driving today. Ok for activity as tolerated starting tomorrow    Diet:  You may resume your normal diet. Plan: You may resume your prescribed medications however, you only need to take protonix once a day now.                Follow-up: Call for an appointment to see Dr. Piero Medellin in the office at [phone] in 2-3 weeks

## 2023-09-18 LAB — SURGICAL PATHOLOGY REPORT: NORMAL

## 2023-10-06 ENCOUNTER — OFFICE VISIT (OUTPATIENT)
Dept: HEMATOLOGY | Age: 58
End: 2023-10-06
Payer: COMMERCIAL

## 2023-10-06 VITALS
RESPIRATION RATE: 16 BRPM | HEIGHT: 65 IN | BODY MASS INDEX: 30.82 KG/M2 | TEMPERATURE: 97.3 F | DIASTOLIC BLOOD PRESSURE: 91 MMHG | HEART RATE: 87 BPM | OXYGEN SATURATION: 96 % | SYSTOLIC BLOOD PRESSURE: 137 MMHG | WEIGHT: 185 LBS

## 2023-10-06 DIAGNOSIS — S36.13XA: Primary | ICD-10-CM

## 2023-10-06 PROCEDURE — 99212 OFFICE O/P EST SF 10 MIN: CPT | Performed by: CLINICAL NURSE SPECIALIST

## 2023-10-06 RX ORDER — METRONIDAZOLE 500 MG/1
500 TABLET ORAL 3 TIMES DAILY
Qty: 42 TABLET | Refills: 0 | Status: SHIPPED | OUTPATIENT
Start: 2023-10-06 | End: 2023-10-20

## 2023-10-06 RX ORDER — DOXYCYCLINE HYCLATE 100 MG
100 TABLET ORAL 2 TIMES DAILY
Qty: 28 TABLET | Refills: 0 | Status: SHIPPED | OUTPATIENT
Start: 2023-10-06 | End: 2023-10-20

## 2023-10-06 NOTE — PROGRESS NOTES
rebound  Extremities: ROM normal    ASSESSMENT: s/p hepaticoduodenostomy with biliary stent placement for iatrogenic CBD injury 7/27/2023 s/p esophagogastroduodenoscopy with antral biopsies 9/14/23 s/p EGD 9/14/2023    PLAN:    - antral bx showed antral mucosa with moderate to severe chronic active gastritis (H. pylori gastritis); Focal intestinal metaplasia, negative for dysplasia; Numerous Helicobacter pylori organisms by immunostaining.   - Increase protonix to BID x 14 days for H Pylori, then decrease to daily  - Quadruple therapy H Pylori as ordered  - the biliary stent had spontaneously passed    - diet as tolerated. - follow up with me in 3-4 weeks    Thank you for the consultation and allowing me to take part in Mr. Teran Bill care. 25 Minutes of which greater than 50% was spent counseling or coordinating Mr Bennett's care.     Case discussed and plan developed with Dr Kalen Iglesias, APRN - CNP 10/6/2023 12:05 PM

## 2023-11-02 ENCOUNTER — OFFICE VISIT (OUTPATIENT)
Dept: HEMATOLOGY | Age: 58
End: 2023-11-02
Payer: COMMERCIAL

## 2023-11-02 VITALS
BODY MASS INDEX: 31.49 KG/M2 | HEART RATE: 99 BPM | TEMPERATURE: 97.3 F | WEIGHT: 189 LBS | RESPIRATION RATE: 15 BRPM | OXYGEN SATURATION: 96 % | HEIGHT: 65 IN | DIASTOLIC BLOOD PRESSURE: 83 MMHG | SYSTOLIC BLOOD PRESSURE: 134 MMHG

## 2023-11-02 DIAGNOSIS — Z09 FOLLOW-UP EXAM: Primary | ICD-10-CM

## 2023-11-02 PROCEDURE — 99212 OFFICE O/P EST SF 10 MIN: CPT | Performed by: CLINICAL NURSE SPECIALIST

## 2023-11-02 PROCEDURE — 99213 OFFICE O/P EST LOW 20 MIN: CPT | Performed by: CLINICAL NURSE SPECIALIST

## 2023-11-02 RX ORDER — PANTOPRAZOLE SODIUM 40 MG/1
40 TABLET, DELAYED RELEASE ORAL 2 TIMES DAILY
Qty: 180 TABLET | Refills: 3 | Status: SHIPPED | OUTPATIENT
Start: 2023-11-02 | End: 2024-10-27

## 2023-11-28 ENCOUNTER — HOSPITAL ENCOUNTER (EMERGENCY)
Dept: HOSPITAL 83 - ED | Age: 58
LOS: 1 days | Discharge: HOME | End: 2023-11-29
Payer: COMMERCIAL

## 2023-11-28 VITALS — WEIGHT: 186 LBS | BODY MASS INDEX: 30.99 KG/M2 | HEIGHT: 65 IN

## 2023-11-28 DIAGNOSIS — Z88.0: ICD-10-CM

## 2023-11-28 DIAGNOSIS — F90.9: ICD-10-CM

## 2023-11-28 DIAGNOSIS — K21.9: ICD-10-CM

## 2023-11-28 DIAGNOSIS — F17.210: ICD-10-CM

## 2023-11-28 DIAGNOSIS — Z79.899: ICD-10-CM

## 2023-11-28 DIAGNOSIS — Z98.890: ICD-10-CM

## 2023-11-28 DIAGNOSIS — R42: Primary | ICD-10-CM

## 2023-11-28 LAB
ALP SERPL-CCNC: 146 U/L (ref 46–116)
ALT SERPL W P-5'-P-CCNC: 90 U/L (ref 5–49)
BASOPHILS # BLD AUTO: 0 10*3/UL (ref 0–0.1)
BASOPHILS NFR BLD AUTO: 0.1 % (ref 0–1)
BUN SERPL-MCNC: 22 MG/DL (ref 9–23)
CHLORIDE SERPL-SCNC: 105 MMOL/L (ref 98–107)
EOSINOPHIL # BLD AUTO: 0.1 10*3/UL (ref 0–0.4)
EOSINOPHIL # BLD AUTO: 1.3 % (ref 1–4)
ERYTHROCYTE [DISTWIDTH] IN BLOOD BY AUTOMATED COUNT: 17.8 % (ref 0–14.5)
HCT VFR BLD AUTO: 46.8 % (ref 42–52)
LYMPHOCYTES # BLD AUTO: 2.1 10*3/UL (ref 1.3–4.4)
LYMPHOCYTES NFR BLD AUTO: 27.9 % (ref 27–41)
MCH RBC QN AUTO: 26.4 PG (ref 27–31)
MCHC RBC AUTO-ENTMCNC: 33.1 G/DL (ref 33–37)
MCV RBC AUTO: 79.6 FL (ref 80–94)
MONOCYTES # BLD AUTO: 0.7 10*3/UL (ref 0.1–1)
MONOCYTES NFR BLD MANUAL: 9.4 % (ref 3–9)
NEUT #: 4.6 10*3/UL (ref 2.3–7.9)
NEUT %: 60.8 % (ref 47–73)
NRBC BLD QL AUTO: 0 10*3/UL (ref 0–0)
PH UR STRIP: 5.5 [PH] (ref 4.5–8)
PLATELET # BLD AUTO: 278 10*3/UL (ref 130–400)
PMV BLD AUTO: 9.8 FL (ref 9.6–12.3)
POTASSIUM SERPL-SCNC: 4.5 MMOL/L (ref 3.4–5.1)
PROT SERPL-MCNC: 7.4 GM/DL (ref 6–8)
RBC # BLD AUTO: 5.88 10*6/UL (ref 4.5–5.9)
RBC #/AREA URNS HPF: (no result) RBC/HPF (ref 0–2)
SP GR UR: >= 1.03 (ref 1–1.03)
UROBILINOGEN UR STRIP-MCNC: 1 E.U./DL (ref 0–1)
WBC #/AREA URNS HPF: (no result) WBC/HPF (ref 0–5)
WBC NRBC COR # BLD AUTO: 7.5 10*3/UL (ref 4.8–10.8)

## 2023-12-06 ENCOUNTER — TELEPHONE (OUTPATIENT)
Dept: HEMATOLOGY | Age: 58
End: 2023-12-06

## 2023-12-06 NOTE — TELEPHONE ENCOUNTER
I called patient and made him a 2 month follow up appt for 1/16/24 at 11:30am at the [facility]. He confirmed this appt.     Electronically signed by Zenovia Boeck, RN on 12/6/2023 at 1:47 PM

## 2024-01-16 ENCOUNTER — OFFICE VISIT (OUTPATIENT)
Dept: SURGERY | Age: 59
End: 2024-01-16
Payer: MEDICAID

## 2024-01-16 VITALS
SYSTOLIC BLOOD PRESSURE: 122 MMHG | BODY MASS INDEX: 33.15 KG/M2 | OXYGEN SATURATION: 96 % | RESPIRATION RATE: 15 BRPM | WEIGHT: 199 LBS | HEIGHT: 65 IN | DIASTOLIC BLOOD PRESSURE: 85 MMHG | TEMPERATURE: 97.3 F | HEART RATE: 99 BPM

## 2024-01-16 DIAGNOSIS — K21.9 GASTROESOPHAGEAL REFLUX DISEASE WITHOUT ESOPHAGITIS: Primary | ICD-10-CM

## 2024-01-16 PROCEDURE — 99212 OFFICE O/P EST SF 10 MIN: CPT | Performed by: TRANSPLANT SURGERY

## 2024-01-16 RX ORDER — FAMOTIDINE 40 MG/1
40 TABLET, FILM COATED ORAL EVERY EVENING
Qty: 90 TABLET | Refills: 4 | Status: SHIPPED | OUTPATIENT
Start: 2024-01-16 | End: 2025-04-10

## 2024-01-16 RX ORDER — DICYCLOMINE HYDROCHLORIDE 10 MG/1
10 CAPSULE ORAL 4 TIMES DAILY
Qty: 360 CAPSULE | Refills: 1 | Status: SHIPPED | OUTPATIENT
Start: 2024-01-16

## 2024-01-16 RX ORDER — PANTOPRAZOLE SODIUM 40 MG/1
40 TABLET, DELAYED RELEASE ORAL
Qty: 90 TABLET | Refills: 4 | Status: SHIPPED | OUTPATIENT
Start: 2024-01-16 | End: 2025-04-10

## 2024-01-16 NOTE — PROGRESS NOTES
Hepatobiliary and Pancreatic Surgery Attending History and Physical    Patient's Name/Date of Birth: Rosalia Bennett /1965 (58 y.o.)    Date: January 16, 2024     CC: GERD with abdominal pain    HPI:  Patient is a very pleasant 58-year-old male who had a common bile duct injury in July 2023.  He was flown to our hospital where he underwent a biliary reconstruction.  He underwent hepaticoduodenostomy.  He then underwent an EGD about 3 months later for biliary stent removal.  This was removed.  However he did have gastritis on his EGD and biopsies were performed which showed numerous H. pylori organisms.  He was treated for H. pylori.  He still has reflux.  He stopped taking Protonix 2 weeks ago because he ran out and his reflux symptoms worsened.  He was also taking Bentyl at that time which did help with esophageal spasms.    Past medical history: GERD    Past Surgical History:   Procedure Laterality Date    LIVER RESECTION N/A 07/27/2023    Bile Duct Repair, Hepaticoduodenostomy with Cholecystectomy performed by Santiago Buckley III, MD at [facility] OR    SHOULDER ARTHROSCOPY DISTAL CLAVICLE EXCISION AND OPEN ROTATOR CUFF REPAIR Right     2018    UPPER GASTROINTESTINAL ENDOSCOPY  9/14/2023    EGD BIOPSY performed by Santiago Buckley III, MD at [facility] ENDOSCOPY       Current Outpatient Medications   Medication Sig Dispense Refill    famotidine (PEPCID) 40 MG tablet Take 1 tablet by mouth every evening 90 tablet 4    dicyclomine (BENTYL) 10 MG capsule Take 1 capsule by mouth 4 times daily 360 capsule 1    pantoprazole (PROTONIX) 40 MG tablet Take 1 tablet by mouth every morning (before breakfast) 90 tablet 4    bismuth subsalicylate (BISMATROL) 262 MG/15ML suspension Take 30 mLs by mouth 4 times daily as needed for Indigestion or Other (h pylori) (Patient not taking: Reported on 1/16/2024) 1680 mL 0     No current facility-administered medications for this visit.       Allergies   Allergen Reactions

## 2024-01-18 ENCOUNTER — TELEPHONE (OUTPATIENT)
Dept: HEMATOLOGY | Age: 59
End: 2024-01-18

## 2024-01-18 ENCOUNTER — PREP FOR PROCEDURE (OUTPATIENT)
Dept: HEMATOLOGY | Age: 59
End: 2024-01-18

## 2024-01-18 PROBLEM — K21.9 GASTROESOPHAGEAL REFLUX DISEASE: Status: ACTIVE | Noted: 2024-01-18

## 2024-01-18 NOTE — TELEPHONE ENCOUNTER
Prior auth for cpt codes 59346 and 70803 were done online and both were approved. The patient is scheduled for his esophagram on 02/16/2024 (first availible) [facility]  Arrive 8:30 am  Scan 9:00 am  NPO midnight    The patient is scheduled for his EGD on 02/22/24 [facility] at 8:00 am.    I called and spoke with the patient and gave him the above time, date, location and instructions for both of his procedures. The patient stated that at this time he does not take any aspirin or blood thinners. While on the phone I also scheduled the patient for his follow up on 03/12/24 at 11:45 am at [facility]. At this time all questions were answered and the patient verbalized understanding  Electronically signed by Sarah Reyes MA on 1/18/2024 at 11:38 AM

## 2024-01-19 RX ORDER — 0.9 % SODIUM CHLORIDE 0.9 %
50 INTRAVENOUS SOLUTION INTRAVENOUS ONCE
Status: CANCELLED | OUTPATIENT
Start: 2024-01-19 | End: 2024-01-19

## 2024-01-19 RX ORDER — SODIUM CHLORIDE 0.9 % (FLUSH) 0.9 %
5-40 SYRINGE (ML) INJECTION EVERY 12 HOURS SCHEDULED
Status: CANCELLED | OUTPATIENT
Start: 2024-01-19

## 2024-01-19 RX ORDER — SODIUM CHLORIDE 0.9 % (FLUSH) 0.9 %
5-40 SYRINGE (ML) INJECTION PRN
Status: CANCELLED | OUTPATIENT
Start: 2024-01-19

## 2024-01-19 RX ORDER — SODIUM CHLORIDE 9 MG/ML
25 INJECTION, SOLUTION INTRAVENOUS PRN
Status: CANCELLED | OUTPATIENT
Start: 2024-01-19

## 2024-02-16 NOTE — PROGRESS NOTES
[facility] PRE-ADMISSION TESTING INSTRUCTIONS    The Preadmission Testing patient is instructed accordingly using the following criteria (check applicable):    ARRIVAL INSTRUCTIONS:  [x] Parking the day of Surgery is located in the Main Entrance lot.  Upon entering the door, make an immediate right to the surgery reception desk    [x] Bring photo ID and insurance card    [] Bring in a copy of Living will or Durable Power of  papers.    [x] Please be sure to arrange for responsible adult to provide transportation to and from the hospital    [x] Please arrange for responsible adult to be with you for the 24 hour period post procedure due to having anesthesia    [x] If you awake am of surgery not feeling well or have temperature >100 please call [phone]    GENERAL INSTRUCTIONS:    [x] May have clear liquids until 2 hours prior to surgery. Examples include water, fruit juices (no pulp), jello, popsicles, black coffee or tea, beef or chicken broth.       May have light meal 6 hours prior to surgery. Example include toast and clear liquids.        No gum, candy or mints.    [x] You may brush your teeth, but do not swallow any water    [x] Take medications as instructed with 1-2 oz of water    [] Stop herbal supplements and vitamins 5 days prior to procedure    [x] Follow preop dosing of blood thinners per physician instructions    [] Take 1/2 dose of evening insulin, but no insulin after midnight    [] No oral diabetic medications after midnight    [] If diabetic and have low blood sugar or feel symptomatic, take 1-2oz apple juice only    [] Bring inhalers day of surgery    [] Bring C-PAP/ Bi-Pap day of surgery    [] Bring urine specimen day of surgery    [x] Shower or bath with soap, lather and rinse well, AM of Surgery, no lotion, powders or creams to surgical site    [] Follow bowel prep as instructed per surgeon    [x] No tobacco products within 24 hours of surgery     [x] No  alcohol or illegal drug use within 24 hours of surgery.    [x] Jewelry, body piercing's, eyeglasses, contact lenses and dentures are not permitted into surgery (bring cases)      [] Please do not wear any nail polish, make up or hair products on the day of surgery    [] You can expect a call the business day prior to procedure to notify you if your arrival time changes    [] If you receive a survey after surgery we would greatly appreciate your comments    [] Parent/guardian of a minor must accompany their child and remain on the premises  the entire time they are under our care     [] Pediatric patients may bring favorite toy, blanket or comfort item with them    [x] A caregiver or family member must remain with the patient during their stay if they are mentally handicapped, have dementia, disoriented or unable to use a call light or would be a safety concern if left unattended    [] Please notify surgeon if you develop any illness between now and time of surgery (cold, cough, sore throat, fever, nausea, vomiting) or any signs of infections  including skin, wounds, and dental.    []  The Outpatient Pharmacy is available to fill your prescription here on your day of surgery, ask your preop nurse for details    [] Other instructions    EDUCATIONAL MATERIALS PROVIDED:    [] PAT Preoperative Education Packet/Booklet     [] Medication List    [] Transfusion bracelet applied with instructions    [] Shower with soap, lather and rinse well, and use CHG wipes provided the evening before surgery as instructed    [] Incentive spirometer with instructions

## 2024-02-16 NOTE — PROGRESS NOTES
LEFT MESSAGE AT DR. MALIN'S OFFICE THAT SHADIA DID NOT DO HIS SONAGRAM TODAY his DAUGHTER WAS ILL.

## 2024-02-19 ENCOUNTER — TELEPHONE (OUTPATIENT)
Dept: HEMATOLOGY | Age: 59
End: 2024-02-19

## 2024-02-19 NOTE — TELEPHONE ENCOUNTER
I rescheduled patient for his Esophagram on 2/10/24 at 10:00am at [facility].  I called patient and I gave him the date, time and location for his esphagram.  I instructed him to arrive by 9:30am.  I gave him directions to the radiology registration area.  He verbalized understanding and he confirmed this appt.    Electronically signed by Ivette Azar RN on 2/19/2024 at 9:07 AM

## 2024-02-21 ENCOUNTER — HOSPITAL ENCOUNTER (OUTPATIENT)
Dept: GENERAL RADIOLOGY | Age: 59
Discharge: HOME | End: 2024-02-23
Attending: TRANSPLANT SURGERY
Payer: COMMERCIAL

## 2024-02-21 DIAGNOSIS — K21.9 GASTROESOPHAGEAL REFLUX DISEASE WITHOUT ESOPHAGITIS: ICD-10-CM

## 2024-02-21 PROCEDURE — 74220 X-RAY XM ESOPHAGUS 1CNTRST: CPT

## 2024-02-21 PROCEDURE — 2500000003 HC RX 250 WO HCPCS: Performed by: PHYSICIAN ASSISTANT

## 2024-02-21 PROCEDURE — 6370000000 HC RX 637 (ALT 250 FOR IP): Performed by: PHYSICIAN ASSISTANT

## 2024-02-21 RX ADMIN — ANTACID/ANTIFLATULENT 1 EACH: 380; 550; 10; 10 GRANULE, EFFERVESCENT ORAL at 10:01

## 2024-02-21 RX ADMIN — BARIUM SULFATE 140 ML: 980 POWDER, FOR SUSPENSION ORAL at 10:01

## 2024-02-21 RX ADMIN — BARIUM SULFATE 176 G: 960 POWDER, FOR SUSPENSION ORAL at 10:01

## 2024-02-21 RX ADMIN — BARIUM SULFATE 1 TABLET: 700 TABLET ORAL at 10:01

## 2024-02-21 NOTE — H&P
Hepatobiliary and Pancreatic Surgery Attending History and Physical     Patient's Name/Date of Birth: Rosalia Bennett /1965 (58 y.o.)     CC: GERD with abdominal pain     HPI:  Patient is a very pleasant 58-year-old male who had a common bile duct injury in July 2023.  He was flown to our hospital where he underwent a biliary reconstruction.  He underwent hepaticoduodenostomy.  He then underwent an EGD about 3 months later for biliary stent removal.  This was removed.  However he did have gastritis on his EGD and biopsies were performed which showed numerous H. pylori organisms.  He was treated for H. pylori.  He still has reflux.  He stopped taking Protonix 2 weeks ago because he ran out and his reflux symptoms worsened.  He was also taking Bentyl at that time which did help with esophageal spasms.     Past medical history: GERD     Past Surgical History         Past Surgical History:   Procedure Laterality Date    LIVER RESECTION N/A 07/27/2023     Bile Duct Repair, Hepaticoduodenostomy with Cholecystectomy performed by Santiago Buckley III, [place] [facility] [place]    SHOULDER ARTHROSCOPY DISTAL CLAVICLE EXCISION AND OPEN ROTATOR CUFF REPAIR Right       2018    UPPER GASTROINTESTINAL ENDOSCOPY   9/14/2023     EGD BIOPSY performed by Santiago Buckley III, MD at [facility] ENDOSCOPY            Current Facility-Administered Medications          Current Outpatient Medications   Medication Sig Dispense Refill    famotidine (PEPCID) 40 MG tablet Take 1 tablet by mouth every evening 90 tablet 4    dicyclomine (BENTYL) 10 MG capsule Take 1 capsule by mouth 4 times daily 360 capsule 1    pantoprazole (PROTONIX) 40 MG tablet Take 1 tablet by mouth every morning (before breakfast) 90 tablet 4    bismuth subsalicylate (BISMATROL) 262 MG/15ML suspension Take 30 mLs by mouth 4 times daily as needed for Indigestion or Other (h pylori) (Patient not taking: Reported on 1/16/2024) 1680 mL 0      No current  rule out GERD along with tertiary contractions.  - restart bentyl   - repeat EGD to rule out H. Pylori for eradication     10 Minutes of which greater than 50% was spent counseling or coordinating his care.     Thank you for the consultation allowing me to take part in Mr. Bennett's care.      Please send a copy of my note to YESI Mixon CNP     Electronically signed by Santiago Buckley MD on 2/21/2024 at 5:01 PM

## 2024-02-22 ENCOUNTER — ANESTHESIA EVENT (OUTPATIENT)
Dept: ENDOSCOPY | Age: 59
End: 2024-02-22
Payer: COMMERCIAL

## 2024-02-22 ENCOUNTER — HOSPITAL ENCOUNTER (OUTPATIENT)
Age: 59
Setting detail: OUTPATIENT SURGERY
Discharge: HOME | End: 2024-02-22
Attending: TRANSPLANT SURGERY | Admitting: TRANSPLANT SURGERY
Payer: COMMERCIAL

## 2024-02-22 ENCOUNTER — ANESTHESIA (OUTPATIENT)
Dept: ENDOSCOPY | Age: 59
End: 2024-02-22
Payer: COMMERCIAL

## 2024-02-22 VITALS
BODY MASS INDEX: 31.82 KG/M2 | TEMPERATURE: 97.6 F | WEIGHT: 191 LBS | SYSTOLIC BLOOD PRESSURE: 99 MMHG | HEIGHT: 65 IN | RESPIRATION RATE: 15 BRPM | OXYGEN SATURATION: 97 % | HEART RATE: 81 BPM | DIASTOLIC BLOOD PRESSURE: 69 MMHG

## 2024-02-22 DIAGNOSIS — K21.9 GASTROESOPHAGEAL REFLUX DISEASE: ICD-10-CM

## 2024-02-22 PROBLEM — Z86.19 HISTORY OF HELICOBACTER PYLORI INFECTION: Status: ACTIVE | Noted: 2024-02-22

## 2024-02-22 PROBLEM — Z98.890 HISTORY OF COMMON BILE DUCT SURGERY: Status: ACTIVE | Noted: 2024-02-22

## 2024-02-22 PROCEDURE — 3609012400 HC EGD TRANSORAL BIOPSY SINGLE/MULTIPLE: Performed by: TRANSPLANT SURGERY

## 2024-02-22 PROCEDURE — 2709999900 HC NON-CHARGEABLE SUPPLY: Performed by: TRANSPLANT SURGERY

## 2024-02-22 PROCEDURE — 7100000010 HC PHASE II RECOVERY - FIRST 15 MIN: Performed by: TRANSPLANT SURGERY

## 2024-02-22 PROCEDURE — 2580000003 HC RX 258: Performed by: NURSE ANESTHETIST, CERTIFIED REGISTERED

## 2024-02-22 PROCEDURE — 88305 TISSUE EXAM BY PATHOLOGIST: CPT

## 2024-02-22 PROCEDURE — 3700000001 HC ADD 15 MINUTES (ANESTHESIA): Performed by: TRANSPLANT SURGERY

## 2024-02-22 PROCEDURE — 43239 EGD BIOPSY SINGLE/MULTIPLE: CPT | Performed by: TRANSPLANT SURGERY

## 2024-02-22 PROCEDURE — 3700000000 HC ANESTHESIA ATTENDED CARE: Performed by: TRANSPLANT SURGERY

## 2024-02-22 PROCEDURE — 7100000011 HC PHASE II RECOVERY - ADDTL 15 MIN: Performed by: TRANSPLANT SURGERY

## 2024-02-22 PROCEDURE — 6360000002 HC RX W HCPCS: Performed by: NURSE ANESTHETIST, CERTIFIED REGISTERED

## 2024-02-22 PROCEDURE — 88342 IMHCHEM/IMCYTCHM 1ST ANTB: CPT

## 2024-02-22 RX ORDER — 0.9 % SODIUM CHLORIDE 0.9 %
50 INTRAVENOUS SOLUTION INTRAVENOUS ONCE
Status: DISCONTINUED | OUTPATIENT
Start: 2024-02-22 | End: 2024-02-22 | Stop reason: HOSPADM

## 2024-02-22 RX ORDER — PROPOFOL 10 MG/ML
INJECTION, EMULSION INTRAVENOUS PRN
Status: DISCONTINUED | OUTPATIENT
Start: 2024-02-22 | End: 2024-02-22 | Stop reason: SDUPTHER

## 2024-02-22 RX ORDER — SODIUM CHLORIDE 0.9 % (FLUSH) 0.9 %
5-40 SYRINGE (ML) INJECTION EVERY 12 HOURS SCHEDULED
Status: DISCONTINUED | OUTPATIENT
Start: 2024-02-22 | End: 2024-02-22 | Stop reason: HOSPADM

## 2024-02-22 RX ORDER — SODIUM CHLORIDE 9 MG/ML
INJECTION, SOLUTION INTRAVENOUS CONTINUOUS PRN
Status: DISCONTINUED | OUTPATIENT
Start: 2024-02-22 | End: 2024-02-22 | Stop reason: SDUPTHER

## 2024-02-22 RX ORDER — SODIUM CHLORIDE 0.9 % (FLUSH) 0.9 %
5-40 SYRINGE (ML) INJECTION PRN
Status: DISCONTINUED | OUTPATIENT
Start: 2024-02-22 | End: 2024-02-22 | Stop reason: HOSPADM

## 2024-02-22 RX ORDER — SODIUM CHLORIDE 9 MG/ML
25 INJECTION, SOLUTION INTRAVENOUS PRN
Status: DISCONTINUED | OUTPATIENT
Start: 2024-02-22 | End: 2024-02-22 | Stop reason: HOSPADM

## 2024-02-22 RX ADMIN — SODIUM CHLORIDE: 9 INJECTION, SOLUTION INTRAVENOUS at 07:30

## 2024-02-22 RX ADMIN — PROPOFOL 230 MG: 10 INJECTION, EMULSION INTRAVENOUS at 07:53

## 2024-02-22 ASSESSMENT — PAIN - FUNCTIONAL ASSESSMENT: PAIN_FUNCTIONAL_ASSESSMENT: 0-10

## 2024-02-22 ASSESSMENT — LIFESTYLE VARIABLES: SMOKING_STATUS: 1

## 2024-02-22 ASSESSMENT — PAIN SCALES - GENERAL: PAINLEVEL_OUTOF10: 0

## 2024-02-22 NOTE — OP NOTE
PROCEDURE NOTE    DATE OF PROCEDURE: 2/22/24     SURGEON: GARCIA Buckley MD FACS    ASSISTANT: none    PREOPERATIVE DIAGNOSIS: Gastroesophageal reflux disease, history of H. pylori, history of bile duct reconstruction    POSTOPERATIVE DIAGNOSIS: Same, alkaline reflux gastritis, small hiatal hernia, duodenitis with duodenal erosions    OPERATION: Esophagogastroduodenoscopy with duodenal and antral biopsies    ANESTHESIA: MAC     ESTIMATED BLOOD LOSS:  less than 50     COMPLICATIONS: None    SPECIMENS:  Was Obtained: Duodenum and stomach    HISTORY: The patient is a 58 y.o. year old male with history of above preop diagnosis.  I recommended esophagogastroduodenoscopy with possible biopsy and I explained the risk, benefits, expected outcome, and alternatives to the procedure.  Risks included but are not limited to bleeding, infection, respiratory distress, hypotension, and perforation of the esophagus, stomach, or duodenum.  Patient understands and is in agreement.    PROCEDURE: The patient was connected to the monitors and given supplemental oxygen by nasal cannula. The patient was sedated.  The gastroscope was inserted orally and advanced under direct vision through the esophagus, through the stomach, through the pylorus, and into the descending duodenum.      Findings:  Duodenum:     Descending: Moderate duodenitis    Bulb: Patent hepaticoduodenostomy, duodenitis.  Cold forcep biopsies were taken of the duodenum to rule out celiac along with H. pylori    Stomach:    Antrum: Alkaline reflux gastritis with moderate to severe gastritis, biopsies were taken to evaluate for H. pylori (cold forcep)    Body: Alkaline reflux gastritis    Fundus: Alkaline reflux gastritis    Esophagus: Small sliding hiatal hernia    Larynx: normal    The scope was removed and the patient tolerated the procedure well.     IMPRESSION/PLAN:   Continue Protonix daily and Pepcid in the evening  I recommend not eating 3 hours prior to

## 2024-02-22 NOTE — INTERVAL H&P NOTE
Update History & Physical    The patient's History and Physical of February 22, 2024 was reviewed with the patient and I examined the patient. There was no change. The surgical site was confirmed by the patient and me.     Plan: The risks, benefits, expected outcome, and alternative to the recommended procedure have been discussed with the patient. Patient understands and wants to proceed with the procedure.     Electronically signed by Santiago Buckley MD on 2/22/2024 at 7:52 AM

## 2024-02-22 NOTE — DISCHARGE INSTRUCTIONS
POST-PROCEDURE INSTRUCTIONS FOR EGD    Rosalia Bennett  2/22/2024      You underwent an EGD (upper endoscopy) today.    You were found to have small hiatal hernia, alkaline reflux gastritis, duodenitis, duodenal erosions.  Your esophagram yesterday was also unremarkable.    I took biopsies of your duodenum and stomach to evaluate for H. pylori again     You may experience: Increased amount of gas.  Slight abdominal bloating and/or cramping.  Drowsiness and/or forgetfulness.    Activity: no driving today. Ok for activity as tolerated starting tomorrow    Diet:  You may resume your normal diet.    Plan: You may resume your prescribed medications.  I recommend taking Protonix in the morning and Pepcid in the evening.  I also recommend not eating 3 hours before bedtime.               Follow-up: Call for an appointment to see Dr. Buckley in the office at [phone] in 2-3 weeks

## 2024-02-22 NOTE — ANESTHESIA POSTPROCEDURE EVALUATION
Department of Anesthesiology  Postprocedure Note    Patient: Rosalia Bennett  MRN: [id number]  YOB: 1965  Date of evaluation: 2/22/2024    Procedure Summary       Date: 02/22/24 Room / Location: [place] [address] / [facility]    Anesthesia Start: 0746 Anesthesia Stop: 0809    Procedure: EGD BIOPSY Diagnosis:       Gastroesophageal reflux disease      (Gastroesophageal reflux disease [K21.9])    Surgeons: Santiago Buckley III, MD Responsible Provider: Concha Garcia DO    Anesthesia Type: MAC ASA Status: 2            Anesthesia Type: No value filed.    Chip Phase I: Chip Score: 10    Chip Phase II:      Anesthesia Post Evaluation    Patient location during evaluation: PACU  Patient participation: complete - patient participated  Level of consciousness: awake and alert  Nausea & Vomiting: no vomiting and no nausea  Cardiovascular status: hemodynamically stable  Respiratory status: acceptable and spontaneous ventilation  Hydration status: stable  Pain management: adequate    No notable events documented.

## 2024-02-22 NOTE — ANESTHESIA PRE PROCEDURE
Department of Anesthesiology  Preprocedure Note       Name:  Rosalia Bennett   Age:  58 y.o.  :  1965                                          MRN:  [id number]         Date:  2024      Surgeon: Surgeon(s):  Santiago Buckley III, MD    Procedure: Procedure(s):  EGD BIOPSY    Medications prior to admission:   Prior to Admission medications    Medication Sig Start Date End Date Taking? Authorizing Provider   famotidine (PEPCID) 40 MG tablet Take 1 tablet by mouth every evening  Patient not taking: Reported on 2024 1/16/24 4/10/25  Santiago Buckley III, MD   dicyclomine (BENTYL) 10 MG capsule Take 1 capsule by mouth 4 times daily 24   Santiago Buckley III, MD   pantoprazole (PROTONIX) 40 MG tablet Take 1 tablet by mouth every morning (before breakfast) 1/16/24 4/10/25  Santiago Buckley III, MD   bismuth subsalicylate (BISMATROL) 262 MG/15ML suspension Take 30 mLs by mouth 4 times daily as needed for Indigestion or Other (h pylori)  Patient not taking: Reported on 2024 10/6/23   Akbar Haque APRN - CNP       Current medications:    Current Facility-Administered Medications   Medication Dose Route Frequency Provider Last Rate Last Admin    sodium chloride 0.9 % bolus 50 mL  50 mL IntraVENous Once Akbar Haque APRN - CNP        sodium chloride flush 0.9 % injection 5-40 mL  5-40 mL IntraVENous 2 times per day Akbar Haque APRN - CNP        sodium chloride flush 0.9 % injection 5-40 mL  5-40 mL IntraVENous PRN Akbar Haque APRN - CNP        0.9 % sodium chloride infusion  25 mL IntraVENous PRN Akbar Haque APRN - CNP           Allergies:    Allergies   Allergen Reactions    Penicillins Other (See Comments)     \"Chokes Me\"       Problem List:    Patient Active Problem List   Diagnosis Code    Common bile duct transection, initial encounter S36.13XA    Gastroesophageal reflux disease K21.9       Past Medical History:        Diagnosis Date

## 2024-02-26 LAB — SURGICAL PATHOLOGY REPORT: NORMAL

## 2024-03-04 RX ORDER — PANTOPRAZOLE SODIUM 40 MG/1
40 TABLET, DELAYED RELEASE ORAL
Qty: 28 TABLET | Refills: 0 | Status: SHIPPED | OUTPATIENT
Start: 2024-03-04 | End: 2024-03-18

## 2024-03-04 RX ORDER — TETRACYCLINE HYDROCHLORIDE 500 MG/1
500 CAPSULE ORAL 4 TIMES DAILY
Qty: 56 CAPSULE | Refills: 0 | Status: SHIPPED | OUTPATIENT
Start: 2024-03-04 | End: 2024-03-18

## 2024-03-04 RX ORDER — METRONIDAZOLE 500 MG/1
500 TABLET ORAL 3 TIMES DAILY
Qty: 42 TABLET | Refills: 0 | Status: SHIPPED | OUTPATIENT
Start: 2024-03-04 | End: 2024-03-18

## 2024-03-05 ENCOUNTER — TELEPHONE (OUTPATIENT)
Dept: HEMATOLOGY | Age: 59
End: 2024-03-05

## 2024-03-05 NOTE — PROGRESS NOTES
Patient prescribed second line therapy for H Pylori, scripts sent to pharmacy. Called patient to notify him several times, went to voicemail, left patient a message to call the office in the am and notified him scripts sent to pharmacy.  Akbar Haque, MIRNA - CNP 3/4/2024 7:51 PM

## 2024-03-06 ENCOUNTER — TELEPHONE (OUTPATIENT)
Dept: HEMATOLOGY | Age: 59
End: 2024-03-06

## 2024-03-06 NOTE — TELEPHONE ENCOUNTER
Patient called in to reschedule his appt due to work conflict.  I rescheduled him for 3/15/24 at 11:15am at the [facility] clinic at [facility].  He confirmed this appt.    Electronically signed by Ivette Azar RN on 3/6/2024 at 2:43 PM

## 2024-03-15 ENCOUNTER — OFFICE VISIT (OUTPATIENT)
Dept: HEMATOLOGY | Age: 59
End: 2024-03-15
Payer: COMMERCIAL

## 2024-03-15 VITALS
OXYGEN SATURATION: 95 % | TEMPERATURE: 97.5 F | RESPIRATION RATE: 15 BRPM | HEIGHT: 65 IN | DIASTOLIC BLOOD PRESSURE: 70 MMHG | HEART RATE: 91 BPM | SYSTOLIC BLOOD PRESSURE: 106 MMHG | WEIGHT: 193.7 LBS | BODY MASS INDEX: 32.27 KG/M2

## 2024-03-15 DIAGNOSIS — A04.8 BACTERIAL INFECTION DUE TO H. PYLORI: Primary | ICD-10-CM

## 2024-03-15 DIAGNOSIS — Z09 FOLLOW-UP EXAM: ICD-10-CM

## 2024-03-15 PROCEDURE — 99212 OFFICE O/P EST SF 10 MIN: CPT | Performed by: TRANSPLANT SURGERY

## 2024-03-15 NOTE — PROGRESS NOTES
Hepatobiliary and Pancreatic Surgery Attending History and Physical    Patient's Name/Date of Birth: Rosalia Bennett /1965 (58 y.o.)    Date: March 15, 2024     CC: GERD with abdominal pain    HPI:  Patient is a very pleasant 58-year-old male who had a common bile duct injury in July 2023.  He was flown to our hospital where he underwent a biliary reconstruction.  He underwent hepaticoduodenostomy.  He then underwent an EGD about 3 months later for biliary stent removal.  This was removed.  However he did have gastritis on his EGD and biopsies were performed which showed numerous H. pylori organisms.  He was treated for H. pylori.  He still has reflux.  He stopped taking Protonix 2 weeks ago because he ran out and his reflux symptoms worsened.  He was also taking Bentyl at that time which did help with esophageal spasms.    He had a repeat EGD with biopsy February 2024 which again showed alkaline reflux gastritis small hiatal hernia with duodenitis.  His biopsy of his antrum showed chronic active H. Pylori.  He was called in tetracycline, flagyl, bismuth, and protonix for 2 weeks.  He states that overall he feels much better with this round of antibiotics than he did the first time.  He states that with the first round of antibiotics for his H. pylori he did not notice much of a difference.  That was the penicillin based therapy.    Physical Exam:  /70   Pulse 91   Temp 97.5 °F (36.4 °C) (Temporal)   Resp 15   Ht 1.651 m (5' 5\")   Wt 87.9 kg (193 lb 11.2 oz)   SpO2 95%   BMI 32.23 kg/m²       PSYCH: mood and affect normal, alert and oriented x 3: No apparent distress, comfortable  EYES: Sclera white, pupils equal round and reactive to light  ENMT:  Hearing normal, trachea midline, ears externally intact  LYMPH: no obvious lympadenopathy in neck.   RESP: Respiratory effort was normal with no retractions or use of accessory muscles.  CV:  No pedal edema  GI/ Abdomen: Soft, nondistended, nontender, no

## 2024-04-02 ENCOUNTER — TELEPHONE (OUTPATIENT)
Dept: HEMATOLOGY | Age: 59
End: 2024-04-02

## 2024-04-02 NOTE — TELEPHONE ENCOUNTER
Per Ivette Azar RN and Dr Buckley, ok to call patient and let him know his breath test came back negative and he is good to go. I called patient and gave him the above information. Told the patient if he needs us to call our office at anytime, patient confirmed  Electronically signed by Sarah Reyes MA on 4/2/2024 at 12:16 PM

## 2024-05-14 ENCOUNTER — HOSPITAL ENCOUNTER (EMERGENCY)
Dept: HOSPITAL 83 - ED | Age: 59
Discharge: HOME | End: 2024-05-14
Payer: COMMERCIAL

## 2024-05-14 VITALS — WEIGHT: 187 LBS | HEIGHT: 65 IN | BODY MASS INDEX: 31.16 KG/M2

## 2024-05-14 DIAGNOSIS — Z79.899: ICD-10-CM

## 2024-05-14 DIAGNOSIS — Z88.0: ICD-10-CM

## 2024-05-14 DIAGNOSIS — F17.210: ICD-10-CM

## 2024-05-14 DIAGNOSIS — Z90.49: ICD-10-CM

## 2024-05-14 DIAGNOSIS — K29.70: Primary | ICD-10-CM

## 2024-05-14 DIAGNOSIS — Z96.611: ICD-10-CM

## 2024-05-14 DIAGNOSIS — R11.2: ICD-10-CM

## 2024-05-14 LAB
ALP SERPL-CCNC: 84 U/L (ref 46–116)
ALT SERPL W P-5'-P-CCNC: 26 U/L (ref 5–49)
BASOPHILS # BLD AUTO: 0 10*3/UL (ref 0–0.1)
BASOPHILS NFR BLD AUTO: 0.3 % (ref 0–1)
BUN SERPL-MCNC: 19 MG/DL (ref 9–23)
CHLORIDE SERPL-SCNC: 106 MMOL/L (ref 98–107)
EOSINOPHIL # BLD AUTO: 0.1 10*3/UL (ref 0–0.4)
EOSINOPHIL # BLD AUTO: 0.7 % (ref 1–4)
ERYTHROCYTE [DISTWIDTH] IN BLOOD BY AUTOMATED COUNT: 19.3 % (ref 0–14.5)
HCT VFR BLD AUTO: 56.1 % (ref 42–52)
LIPASE SERPL-CCNC: 59 U/L (ref 12–53)
LYMPHOCYTES # BLD AUTO: 1.9 10*3/UL (ref 1.3–4.4)
LYMPHOCYTES NFR BLD AUTO: 19.5 % (ref 27–41)
MCH RBC QN AUTO: 22 PG (ref 27–31)
MCHC RBC AUTO-ENTMCNC: 29.9 G/DL (ref 33–37)
MCV RBC AUTO: 73.4 FL (ref 80–94)
MONOCYTES # BLD AUTO: 0.8 10*3/UL (ref 0.1–1)
MONOCYTES NFR BLD MANUAL: 8.1 % (ref 3–9)
NEUT #: 6.7 10*3/UL (ref 2.3–7.9)
NEUT %: 71 % (ref 47–73)
NRBC BLD QL AUTO: 0 10*3/UL (ref 0–0)
PLATELET # BLD AUTO: 319 10*3/UL (ref 130–400)
PMV BLD AUTO: 10.1 FL (ref 9.6–12.3)
POTASSIUM SERPL-SCNC: 4.4 MMOL/L (ref 3.4–5.1)
PROT SERPL-MCNC: 7.5 GM/DL (ref 6–8)
RBC # BLD AUTO: 7.64 10*6/UL (ref 4.5–5.9)
WBC NRBC COR # BLD AUTO: 9.5 10*3/UL (ref 4.8–10.8)

## (undated) DEVICE — GRADUATE TRIANG MEASURE 1000ML BLK PRNT

## (undated) DEVICE — FORCEPS BX OVL CUP FEN DISPOSABLE CAP L 160CM RAD JAW 4

## (undated) DEVICE — ENFIT SIL FEEDING TUBE PURP  8FR 40CM: Brand: ENFIT SIL FEEDING TUBE

## (undated) DEVICE — APPLICATOR MEDICATED 26 CC SOLUTION HI LT ORNG CHLORAPREP

## (undated) DEVICE — DRAIN SURG 19FR 100% SIL RADPQ RND CHN FULL FLUT

## (undated) DEVICE — 1LYRTR 16FR10ML100%SILTMPS SNP: Brand: MEDLINE INDUSTRIES, INC.

## (undated) DEVICE — Device

## (undated) DEVICE — GOWN,SIRUS,FABRNF,XL,20/CS: Brand: MEDLINE

## (undated) DEVICE — 3M™ IOBAN™ 2 ANTIMICROBIAL INCISE DRAPE 6640EZ: Brand: IOBAN™ 2

## (undated) DEVICE — SPONGE GZ W4XL4IN RAYON POLY CVR W/NONWOVEN FAB STRL 2/PK

## (undated) DEVICE — LIQUIBAND RAPID ADHESIVE 36/CS 0.8ML: Brand: MEDLINE

## (undated) DEVICE — TOWEL,OR,DSP,ST,BLUE,STD,6/PK,12PK/CS: Brand: MEDLINE

## (undated) DEVICE — MAGNETIC INSTR DRAPE 20X16: Brand: MEDLINE INDUSTRIES, INC.

## (undated) DEVICE — GLOVE SURG SZ 75 L12IN FNGR THK94MIL TRNSLUC YEL LTX

## (undated) DEVICE — DRAPE,REIN 53X77,STERILE: Brand: MEDLINE

## (undated) DEVICE — BLOCK BITE 60FR RUBBER ADLT DENTAL

## (undated) DEVICE — DEVICE GRSP L230CM SHTH DIA2.4MM HYBRID JAW FLX DST WIRE

## (undated) DEVICE — CLEANER,CAUTERY TIP,2X2",STERILE: Brand: MEDLINE

## (undated) DEVICE — BLANKET WRM W35.4XL86.6IN FULL UNDERBODY + FORC AIR

## (undated) DEVICE — GAUZE,SPONGE,4"X4",16PLY,XRAY,STRL,LF: Brand: MEDLINE

## (undated) DEVICE — CATHETER,URETHRAL,REDRUBBER,STERILE,8FR: Brand: MEDLINE

## (undated) DEVICE — SPONGE,DRAIN,NONWVN,4"X4",6PLY,STRL,LF: Brand: MEDLINE

## (undated) DEVICE — DRAIN CHN 19FR L0.25MM DIA6.3MM SIL RND HUBLESS FULL FLUT

## (undated) DEVICE — STRIP,CLOSURE,WOUND,MEDI-STRIP,1/4X3: Brand: MEDLINE

## (undated) DEVICE — CLIP LIG M BLU TI HRT SHP WIRE HORZ 600 PER BX